# Patient Record
Sex: FEMALE | Race: WHITE | NOT HISPANIC OR LATINO | Employment: FULL TIME | URBAN - METROPOLITAN AREA
[De-identification: names, ages, dates, MRNs, and addresses within clinical notes are randomized per-mention and may not be internally consistent; named-entity substitution may affect disease eponyms.]

---

## 2017-01-23 ENCOUNTER — ALLSCRIPTS OFFICE VISIT (OUTPATIENT)
Dept: OTHER | Facility: OTHER | Age: 27
End: 2017-01-23

## 2017-01-26 LAB
ATOPOBIUM VAGINAE (HISTORICAL): NOT DETECTED LOG (CELLS/ML)
BV CATEGORY (HISTORICAL): NORMAL
C. ALBICANS, DNA OR PCR (HISTORICAL): NOT DETECTED
C. GLABRATA, DNA OR PCR (HISTORICAL): NOT DETECTED
C. PARAPSILOSIS, DNA OR PCR (HISTORICAL): NOT DETECTED
C. TROPICALIS, DNA OR PCR (HISTORICAL): NOT DETECTED
CHLAMYDIA TRACHOMATIS BY MOL. METHOD (HISTORICAL): NOT DETECTED
GARDNERELLA VAGINALIS (HISTORICAL): NOT DETECTED LOG (CELLS/ML)
GC BY MOL. METHOD (HISTORICAL): NOT DETECTED
LACTOBACILLUS (SPECIES) (HISTORICAL): 7.2 LOG (CELLS/ML)
MEGASPHAERA TYPE 1 (HISTORICAL): NOT DETECTED LOG (CELLS/ML)
TRICHOMONAS (HISTORICAL): NOT DETECTED

## 2017-06-18 ENCOUNTER — HOSPITAL ENCOUNTER (EMERGENCY)
Facility: HOSPITAL | Age: 27
Discharge: HOME | End: 2017-06-18
Admitting: EMERGENCY MEDICINE
Payer: COMMERCIAL

## 2017-06-18 VITALS
OXYGEN SATURATION: 97 % | DIASTOLIC BLOOD PRESSURE: 80 MMHG | SYSTOLIC BLOOD PRESSURE: 150 MMHG | TEMPERATURE: 98.2 F | HEART RATE: 90 BPM | WEIGHT: 261 LBS | RESPIRATION RATE: 16 BRPM

## 2017-06-18 DIAGNOSIS — H10.9 CONJUNCTIVITIS: Primary | ICD-10-CM

## 2017-06-18 PROCEDURE — 99282 EMERGENCY DEPT VISIT SF MDM: CPT

## 2017-06-18 RX ORDER — METFORMIN HYDROCHLORIDE 750 MG/1
500 TABLET, EXTENDED RELEASE ORAL 2 TIMES DAILY
COMMUNITY
End: 2019-05-09

## 2017-06-18 RX ORDER — DEXTROAMPHETAMINE SACCHARATE, AMPHETAMINE ASPARTATE, DEXTROAMPHETAMINE SULFATE AND AMPHETAMINE SULFATE 3.75; 3.75; 3.75; 3.75 MG/1; MG/1; MG/1; MG/1
15 TABLET ORAL AS NEEDED
COMMUNITY
End: 2019-03-12

## 2017-06-18 RX ORDER — ERYTHROMYCIN 5 MG/G
0.5 OINTMENT OPHTHALMIC EVERY 6 HOURS
Qty: 3.5 G | Refills: 0 | Status: SHIPPED | OUTPATIENT
Start: 2017-06-18 | End: 2017-06-28

## 2017-06-18 RX ORDER — BUPROPION HYDROCHLORIDE 75 MG/1
75 TABLET ORAL 2 TIMES DAILY
COMMUNITY
End: 2019-03-12

## 2018-01-11 NOTE — RESULT NOTES
Verified Results  (1) URINE CULTURE [id number] 04:00PM Boubacar Goodman     Test Name Result Flag Reference   CULTURE, URINE, ROUTINE  A    CULTURE, URINE, ROUTINE         MICRO NUMBER:      [id number]    TEST STATUS:       FINAL    SPECIMEN SOURCE:   URINE    SPECIMEN QUALITY:  ADEQUATE    RESULT:            Greater than 100,000 CFU/mL of Escherichia coli                            E coli                            ----------------                            INT   SERA     AMOX/CLAVULANATE       S     8 0     AMPICILLIN             R     >=32 0     AMP/SULBACTAM          I     16 0     CEFAZOLIN              NR    <=4 0 **1     CEFEPIME               S     <=1 0     CEFTRIAXONE            S     <=1 0     CIPROFLOXACIN          S     0 5     ERTAPENEM              S     <=0 5     GENTAMICIN             R     >=16 0     IMIPENEM               S     <=0 25     LEVOFLOXACIN           S     1 0     NITROFURANTOIN         S     <=16 0     PIP/TAZOBACTAM         S     <=4 0     TOBRAMYCIN             I     8 0     TRIMETHOPRIM/SULFA     R     >=320 0  S=Susceptible  I=Intermediate  R=Resistant  * = Not Tested  NR = Not Reported  **NN = See Therapy Comments  THERAPY COMMENTS      Note 1:      ORAL therapy: A cefazolin SERA of < 32 predicts      susceptibility to the oral agents cefaclor,      cefdinir, cefpodoxime, cefprozil, cefuroxime,      cephalexin, and loracarbef when used for therapy      of uncomplicated UTIs due to E  coli,      K  pneumoniae, and P  mirabilis  PARENTERAL therapy: A cefazolin SERA of > 8      indicates resistance to parenteral cefazolin  An alternate test method must be performed to      to confirm susceptibility to parenteral cefazolin  NO COLLECTION DATE RECEIVED  WE HAVE USED  THE DATE THE SPECIMEN WAS RECEIVED BY THIS  LABORATORY AS THE COLLECTION DATE  IF THIS  IS INCORRECT, PLEASE CONTACT CLIENT SERVICES    PHONE NUMBER: [phone]

## 2018-01-12 NOTE — PROGRESS NOTES
Chief Complaint  Pt  presents office for urine dip  Symptoms are pain and frequency  No hx stones  known DM II  As per BEO to send for culture and UA      Active Problems    1  Blood type A+ (V49 89) (Z67 10)   2  Contraceptive education (V25 09) (Z30 09)   3  Dietary calcium deficiency (269 3) (E58)   4  Dysmenorrhea (625 3) (N94 6)   5  Encounter for routine gynecological examination (V72 31) (Z01 419)   6  Exposure to STD (V01 6) (Z20 2)   7  High-risk sexual behavior (V69 2) (Z72 51)   8  Inadequate exercise (V69 0) (Z72 3)   9  Inappropriate diet and eating habits (V69 1) (Z72 4)   10  Irritable bowel syndrome (564 1) (K58 9)   11  Morbid obesity due to excess calories (278 01) (E66 01)   12  PCOS (polycystic ovarian syndrome) (256 4) (E28 2)   13  Type 2 diabetes mellitus (250 00) (E11 9)   14  Yeast vaginitis (112 1) (B37 3)    Current Meds   1  Accu-Chek FastClix Lancets Miscellaneous; Therapy: 82IIT5583 to Recorded   2  Accu-Chek Jammie SmartView w/Device Kit; Therapy: 08LUA7440 to Recorded   3  Accu-Chek SmartView In [employer]; Therapy: 15CHG0948 to Recorded   4  Allegra-D Allergy & Congestion 180-240 MG Oral Tablet Extended Release 24 Hour; TAKE   1 TABLET DAILY; Therapy: 69JKE4970 to Recorded   5  BuPROPion HCl - 75 MG Oral Tablet; Therapy: 01ZCD9551 to Recorded   6  Byetta 10 MCG Pen 10 MCG/0 04ML Subcutaneous Solution Pen-injector; Therapy: 27VCQ8635 to Recorded   7  [employer] Unifine Pentips Plus 32G X 4 MM Miscellaneous; Therapy: 86VBZ0231 to Recorded   8  Fluticasone Propionate 50 MCG/ACT Nasal Suspension; Therapy: 68JHX5612 to Recorded   9  MetFORMIN HCl  MG Oral Tablet Extended Release 24 Hour; Therapy: 50KXA7598 to Recorded   10  Norethindrone 0 35 MG Oral Tablet; Take 1 tablet daily; Therapy: 48LRO8512 to (Evaluate:66Mtd5802)  Requested for: 23Nov2016; Last    Rx:23Nov2016 Ordered    Allergies    1   No Known Drug Allergies    Results/Data  Urine Dip Non-Automated- POC [id number] 02:21PM Ariella Womack     Test Name Result Flag Reference   Leukocytes negaive     Nitrite negative     Blood large     Bilirubin small     Urobilinogen 0 2     Protein negative     Ph 5 0     Specific Gravity 1 015     Ketone 80     Glucose 2000         Assessment    1  Hematuria (599 70) (R31 9)   2  Dysuria (788 1) (R30 0)    Plan  Dysuria    · (1) URINE CULTURE; Source:Urine, Clean Catch; Status:Active; Requested  for:73Gyi5685;    · (Q) URINALYSIS MACROSCOPIC; Status:Active; Requested for:55Leg0857;    · (Q) URINALYSIS MICROSCOPIC; Status:Active; Requested for:79Wia2727;    · Urine Dip Non-Automated- POC; Status:Complete;   Done: 99LWE9874 02:21PM    Future Appointments    Date/Time Provider Specialty Site   02/21/2017 03:15 PM MARY Pearce   [facility]     Signatures   Electronically signed by : MARY Pascual ; Dec 20 2016  8:26PM EST                       (Author)

## 2018-01-13 VITALS
SYSTOLIC BLOOD PRESSURE: 124 MMHG | BODY MASS INDEX: 42.43 KG/M2 | DIASTOLIC BLOOD PRESSURE: 80 MMHG | HEIGHT: 66 IN | WEIGHT: 264 LBS

## 2018-01-15 NOTE — CONSULTS
Assessment    1  Irritable bowel syndrome (564 1) (K58 9)   2  Morbid obesity due to excess calories (278 01) (E66 01)   3  Type 2 diabetes mellitus (250 00) (E11 9)    Plan  Morbid obesity due to excess calories    · 1 Mariia Parker MD, [facility]  (General Surgery) Physician Referral  Consult  Status: Active   Requested for: 07NNV3748   Ordered; For: Morbid obesity due to excess calories; Ordered By: Sundar Ashley Performed:  Due: 09YJO8318  Care Summary provided  : Yes   · Follow-up PRN Evaluation and Treatment  Follow-up  Status: Complete  Done:  97HBV9760   Ordered; For: Morbid obesity due to excess calories; Ordered By: Sundar Ashley Performed:  Due: 03MPC0480    Discussion/Summary  Discussion Summary:   77-year-old pleasant female with morbid obesity and type 2 diabetes here for evaluation of weight loss and irritable bowel syndrome  Her irritable bowel syndrome is control his dietary modification  No further workup is indicated for her IBS at this time  Were discussed lifestyle changes such as decreased caloric intake and regular exercise  She is interested in the bariatric surgery for weight loss  I will refer her to bariatric surgery team     She will follow up with me in as-needed basis  Medication SE Review and Pt Understands Tx: Possible side effects of new medications were reviewed with the patient/guardian today  The treatment plan was reviewed with the patient/guardian  The patient/guardian understands and agrees with the treatment plan   Understands and agrees with treatment plan: The treatment plan was reviewed with the patient/guardian  The patient/guardian understands and agrees with the treatment plan   Counseling Documentation With Imm: The patient was counseled regarding diagnostic results, instructions for management, risk factor reductions, prognosis, patient and family education, impressions        Chief Complaint  Chief Complaint Free Text Note Form: Pt with history of IBS here for pre-weight loss surgery consult      History of Present Illness  HPI: 27-year-old pleasant female with history of morbid obesity and type 2 diabetes here for evaluation of morbid obesity and irritable bowel syndrome  Patient has mild abdominal bloating and occasional diarrhea  Her symptoms has been going on for many years  She is able to control her symptoms was dietary modification  Sometimes she gets diarrhea which she attributes to metformin  She is interested in weight loss surgery  She actually tought she is seeing bariatric surgeon today  History Reviewed: The history was obtained today from the patient and I agree with the documented history  Review of Systems  Complete-Female GI Adult:   Constitutional: No fever, no chills, feels well, no tiredness, no recent weight gain or weight loss  Eyes: No complaints of eye pain, no red eyes, no eyesight problems, no discharge, no dry eyes, no itching of eyes  ENT: no complaints of earache, no loss of hearing, no nose bleeds, no nasal discharge, no sore throat, no hoarseness  Cardiovascular: No complaints of slow heart rate, no fast heart rate, no chest pain, no palpitations, no leg claudication, no lower extremity edema  Respiratory: No complaints of shortness of breath, no wheezing, no cough, no SOB on exertion, no orthopnea, no PND  Gastrointestinal: No complaints of abdominal pain, no constipation, no nausea or vomiting, no diarrhea, no bloody stools  Genitourinary: No complaints of dysuria, no incontinence, no pelvic pain, no dysmenorrhea, no vaginal discharge or bleeding  Musculoskeletal: No complaints of arthralgias, no myalgias, no joint swelling or stiffness, no limb pain or swelling  Integumentary: No complaints of skin rash or lesions, no itching, no skin wounds, no breast pain or lump     Neurological: No complaints of headache, no confusion, no convulsions, no numbness, no dizziness or fainting, no tingling, no limb weakness, no difficulty walking  Psychiatric: Not suicidal, no sleep disturbance, no anxiety or depression, no change in personality, no emotional problems  Endocrine: No complaints of proptosis, no hot flashes, no muscle weakness, no deepening of the voice, no feelings of weakness  Hematologic/Lymphatic: No complaints of swollen glands, no swollen glands in the neck, does not bleed easily, does not bruise easily  ROS Reviewed:   ROS reviewed  Active Problems    1  Blood type A+ (V49 89) (Z67 10)   2  Contraceptive education (V25 09) (Z30 09)   3  Dietary calcium deficiency (269 3) (E58)   4  Encounter for routine gynecological examination (V72 31) (Z01 419)   5  Exposure to STD (V01 6) (Z20 2)   6  High-risk sexual behavior (V69 2) (Z72 51)   7  Inadequate exercise (V69 0) (Z72 3)   8  Inappropriate diet and eating habits (V69 1) (Z72 4)   9  Type 2 diabetes mellitus (250 00) (E11 9)    Past Medical History    1  History of , incomplete (637 91) (O03 4)   2  History of Adult BMI 40 0-44 9 kg/sq m (V85 41) (Z68 41)   3  Denied: History of abnormal cervical Pap smear   4  History of dysmenorrhea (V13 29) (Z87 42)   5  Denied: History of herpes simplex infection   6  History of hypercholesterolemia (V12 29) (Z86 39)   7  History of menorrhagia (V13 29) (Z87 42)   8  History of pregnancy (V13 29)   9  History of vaccination against human papillomavirus (V45 89) (Z98 89)   10  History of Migraine without aura (346 10) (G43 009)   11  History of Varicella vaccination (V05 4) (Z23)  Active Problems And Past Medical History Reviewed: The active problems and past medical history were reviewed and updated today  Surgical History    1  History of Oral Surgery Tooth Extraction   2  History of Tonsillectomy  Surgical History Reviewed: The surgical history was reviewed and updated today  Family History    1  Family history of bipolar disorder (V17 0) (Z81 8)   2  Family history of migraine (V17 2) (Z82 0)    3  Family history of stroke (V17 1) (Z82 3)   4  Family history of type 2 diabetes mellitus (V18 0) (Z83 3)    5  Family history of osteoporosis (V17 81) (Z82 62)    6  Family history of type 2 diabetes mellitus (V18 0) (Z83 3)    7  Family history of ischemic heart disease (V17 3) (Z82 49)  Family History Reviewed: The family history was reviewed and updated today  Social History    · Alcohol use (V49 89) (F10 99)   · Education history   · High-risk sexual behavior (V69 2) (Z72 51)   · Denied: History of drug use   · Inadequate exercise (V69 0) (Z72 3)   · Never a smoker   · Occupation   · Zoroastrianism Affiliation   · Single  Social History Reviewed: The social history was reviewed and updated today  The social history was reviewed and is unchanged  Current Meds   1  Allegra-D Allergy & Congestion 180-240 MG Oral Tablet Extended Release 24 Hour; TAKE   1 TABLET DAILY; Therapy: 13AGK4800 to Recorded   2  MetFORMIN HCl - 1000 MG Oral Tablet; TAKE 1 TABLET EVERY 12 HOURS DAILY; Therapy: 24GBW6894 to Recorded  Medication List Reviewed: The medication list was reviewed and updated today  Allergies    1  No Known Drug Allergies    Vitals  Vital Signs [Data Includes: Current Encounter]    Recorded: 21Jan2016 01:19PM   Temperature 97 1 F   Heart Rate 83   Respiration 16   Systolic 355   Diastolic 85   Height 5 ft 6 in   Weight 267 lb    BMI Calculated 43 09   BSA Calculated 2 27     Physical Exam    Constitutional   General appearance: Abnormal   obese  Eyes   Conjunctiva and lids: No swelling, erythema or discharge  Pupils and irises: Equal, round and reactive to light  Ears, Nose, Mouth, and Throat   External inspection of ears and nose: Normal     Nasal mucosa, septum, and turbinates: Normal without edema or erythema  Oropharynx: Normal with no erythema, edema, exudate or lesions  Pulmonary   Respiratory effort: No increased work of breathing or signs of respiratory distress  Auscultation of lungs: Clear to auscultation  Cardiovascular   Palpation of heart: Normal PMI, no thrills  Auscultation of heart: Normal rate and rhythm, normal S1 and S2, without murmurs  Examination of extremities for edema and/or varicosities: Normal     Carotid pulses: Normal     Abdomen   Abdomen: Abnormal   obese, soft non tender, positive bowel sounds  no organomegally  Liver and spleen: No hepatomegaly or splenomegaly  Lymphatic   Palpation of lymph nodes in neck: No lymphadenopathy  Musculoskeletal   Gait and station: Normal     Digits and nails: Normal without clubbing or cyanosis  Inspection/palpation of joints, bones, and muscles: Normal     Skin   Skin and subcutaneous tissue: Normal without rashes or lesions  Neurologic   Cranial nerves: Cranial nerves 2-12 intact  Reflexes: 2+ and symmetric  Sensation: No sensory loss      Psychiatric   Orientation to person, place, and time: Normal     Mood and affect: Normal          Signatures   Electronically signed by : Jeannette Burch MD; Jan 21 2016  1:42PM EST                       (Author)

## 2018-01-16 NOTE — RESULT NOTES
Verified Results  (Q) URINALYSIS MACROSCOPIC [id number] 09:00PM Crane Gross     Test Name Result Flag Reference   COLOR YELLOW  YELLOW   APPEARANCE CLOUDY A CLEAR   SPECIFIC GRAVITY 1 030  1 001-1 035   PH 5 5  5 0-8 0   GLUCOSE 3+ A NEGATIVE   BILIRUBIN NEGATIVE  NEGATIVE   KETONES 1+ A NEGATIVE   OCCULT BLOOD 3+ A NEGATIVE   PROTEIN NEGATIVE  NEGATIVE   NITRITE POSITIVE A NEGATIVE   LEUKOCYTE ESTERASE TRACE A NEGATIVE     (Q) URINALYSIS MICROSCOPIC [id number] 09:00PM Bryant Gross     Test Name Result Flag Reference   WBC > OR = 60 /HPF A < OR = 5   RBC 40-60 /HPF A < OR = 2   SQUAMOUS EPITHELIAL CELLS 0-5 /HPF  < OR = 5   BACTERIA MANY /HPF A NONE SEEN   HYALINE CAST NONE SEEN /LPF  NONE SEEN

## 2018-05-01 ENCOUNTER — CLINICAL SUPPORT (OUTPATIENT)
Dept: OBGYN CLINIC | Facility: CLINIC | Age: 28
End: 2018-05-01
Payer: COMMERCIAL

## 2018-05-01 VITALS — BODY MASS INDEX: 43.45 KG/M2 | WEIGHT: 269.2 LBS

## 2018-05-01 DIAGNOSIS — R30.0 DYSURIA: Primary | ICD-10-CM

## 2018-05-01 LAB
SL AMB  POCT GLUCOSE, UA: NEGATIVE
SL AMB LEUKOCYTE ESTERASE,UA: NEGATIVE
SL AMB POCT BILIRUBIN,UA: NORMAL
SL AMB POCT BLOOD,UA: NEGATIVE
SL AMB POCT CLARITY,UA: NORMAL
SL AMB POCT COLOR,UA: NORMAL
SL AMB POCT KETONES,UA: NORMAL
SL AMB POCT NITRITE,UA: NEGATIVE
SL AMB POCT PH,UA: 5
SL AMB POCT SPECIFIC GRAVITY,UA: 1.02
SL AMB POCT URINE PROTEIN: NORMAL
SL AMB POCT UROBILINOGEN: 0.2

## 2018-05-01 PROCEDURE — 81002 URINALYSIS NONAUTO W/O SCOPE: CPT | Performed by: OBSTETRICS & GYNECOLOGY

## 2018-05-01 NOTE — PROGRESS NOTES
Patient presents to office for walk in Urine Dip, patient states she is experiencing frequency  Urine culture sent to lab for testing

## 2018-10-01 ENCOUNTER — ANNUAL EXAM (OUTPATIENT)
Dept: OBGYN CLINIC | Facility: CLINIC | Age: 28
End: 2018-10-01
Payer: COMMERCIAL

## 2018-10-01 VITALS
BODY MASS INDEX: 42.01 KG/M2 | DIASTOLIC BLOOD PRESSURE: 74 MMHG | SYSTOLIC BLOOD PRESSURE: 118 MMHG | HEIGHT: 66 IN | WEIGHT: 261.4 LBS

## 2018-10-01 DIAGNOSIS — Z20.2 POSSIBLE EXPOSURE TO STD: ICD-10-CM

## 2018-10-01 DIAGNOSIS — Z72.3 INADEQUATE EXERCISE: ICD-10-CM

## 2018-10-01 DIAGNOSIS — Z01.419 ENCOUNTER FOR ANNUAL ROUTINE GYNECOLOGICAL EXAMINATION: Primary | ICD-10-CM

## 2018-10-01 DIAGNOSIS — E58 DIETARY CALCIUM DEFICIENCY: ICD-10-CM

## 2018-10-01 DIAGNOSIS — Z12.4 PAP SMEAR FOR CERVICAL CANCER SCREENING: ICD-10-CM

## 2018-10-01 DIAGNOSIS — Z30.09 CONTRACEPTIVE EDUCATION: ICD-10-CM

## 2018-10-01 PROCEDURE — 99395 PREV VISIT EST AGE 18-39: CPT | Performed by: OBSTETRICS & GYNECOLOGY

## 2018-10-01 NOTE — PROGRESS NOTES
Pt is a 29 y o  Leon Angulo with Patient's last menstrual period was 2018  using none for [facility] presents for preventive care  She notes the different partner since her last STI evaluation  In her lifetime she has been involved with >5 partners   Safe sexual practices (monogomy, condoms) are not followed consistently  Pt reports she is interested in an IUD for contraception--reviewed laisha, kyleena, jennifer and paragard  Pt desires kyleena--reviewed risks of irregular bleedig, pain, uterine perforation, infection, failure and ectopic pregnancy and pt desires to proceed  · She does  feel safe in the relationship  She does feel safe in her home  · Her calcium intake encompasses milk (cow, goat, almond, cashew, soy, etc), cheese and yogurt for a total of 1-2 servings daily on average  She does not take additional Vitamin D (MVI or supplement)  · She exercises minimal times per week  · Her menses occur every 28 Days, last 2-3 days and require regular tampons every 7-8 hours  Menstrual History:  OB History      Para Term  AB Living    2 0     2      SAB TAB Ectopic Multiple Live Births    1 1              Obstetric Comments    Menarche: 12    Menses: 28/2-3/regular tampon every 8 hours           Menarche age: 15  Patient's last menstrual period was 2018  ·      · She has completed the HPV vaccine series appropriate for age    · tobacco use : does not use tobacco              · Last pap: ~; repeat today  · Pt desires ch/wilbert screening    Past Medical History:   Diagnosis Date    Blood type A+     Diabetes mellitus ([facility] [address] )     per [employer]: Type 2    Dietary calcium deficiency     Last Assessed:8/18/15    Dysmenorrhea     adequate response to IBuprofen; Last Assessed:16    Dysuria     LAst Assessed:16    Hematuria     Last Assessed:16    Hypercholesterolemia     IBS (irritable bowel syndrome)     Last Assessed:16    Menorrhagia     Migraine without aura     Morbid obesity due to excess calories (HCC)     Last Assessed:16    PCOS (polycystic ovarian syndrome)        Past Surgical History:   Procedure Laterality Date    DILATION AND EVACUATION  12/15/2017    ETOP    TONSILLECTOMY      WISDOM TOOTH EXTRACTION         OB History    Para Term  AB Living   2 0     2     SAB TAB Ectopic Multiple Live Births   1 1            # Outcome Date GA Lbr Nicolás/2nd Weight Sex Delivery Anes PTL Lv   2 TAB            1 SAB               Obstetric Comments   Menarche: 12      Menses: 28/2-3/regular tampon every 8 hours       Gyn HX:  dysmenorrhea       Current Outpatient Prescriptions:     amphetamine-dextroamphetamine (ADDERALL) 15 MG tablet, Take 15 mg by mouth as needed, Disp: , Rfl:     buPROPion (WELLBUTRIN) 75 mg tablet, Take 75 mg by mouth 2 (two) times a day, Disp: , Rfl:     Exenatide (BYETTA 10 MCG PEN SC), Inject under the skin, Disp: , Rfl:     metFORMIN (GLUCOPHAGE-XR) 750 mg 24 hr tablet, Take 750 mg by mouth 2 (two) times a day, Disp: , Rfl:     Allergies   Allergen Reactions    Amoxicillin Other (See Comments)     Yeast infection       Social History     Social History    Marital status: Single     Spouse name: N/A    Number of children: 0    Years of education: HS, cosmetology     Occupational History    beauty/hair salon      Social History Main Topics    Smoking status: Never Smoker    Smokeless tobacco: Never Used    Alcohol use Yes      Comment: occ; 1x/month    Drug use: No    Sexual activity: Yes     Partners: Male     Birth control/ protection: None      Comment: lifetime partners: 21; current partner 1 month     Other Topics Concern    None     Social History Narrative    Inadequate Exercise-none    Inappropriate diet and eating habits    Education: GED    Orthodox: Oriental orthodox non Rastafari    Accepts blood products    Calcium: irregular vitamin use, 1 c almond 3x/week; occ cheese, 1 yogurt 3-4x/week Family History   Problem Relation Age of Onset    Bipolar disorder Mother     Migraines Mother     Stroke Father     Diabetes type II Father     Alcohol abuse Father     Osteoporosis Maternal Grandmother     Lymphoma Maternal Grandmother     Heart disease Maternal Grandfather         Ischemic    Prostate cancer Maternal Grandfather     Diabetes type II Paternal Grandmother     No Known Problems Sister     Breast cancer Neg Hx     Colon cancer Neg Hx     Ovarian cancer Neg Hx        Blood pressure 118/74, height 5' 5 75" (1 67 m), weight 119 kg (261 lb 6 4 oz), last menstrual period 09/14/2018  and Body mass index is 42 52 kg/m²  Physical Exam   Constitutional: She is oriented to person, place, and time  She appears well-developed and well-nourished  HENT:   Head: Normocephalic and atraumatic  Eyes: Conjunctivae and EOM are normal    Neck: Normal range of motion  Neck supple  No tracheal deviation present  No thyromegaly present  Cardiovascular: Normal rate, regular rhythm and normal heart sounds  Pulmonary/Chest: Effort normal and breath sounds normal  No stridor  No respiratory distress  She has no wheezes  She has no rales  Abdominal: Soft  Bowel sounds are normal  She exhibits no distension and no mass  There is no tenderness  There is no rebound and no guarding  Musculoskeletal: Normal range of motion  She exhibits no edema or tenderness  Lymphadenopathy:     She has no cervical adenopathy  Neurological: She is alert and oriented to person, place, and time  Skin: Skin is warm  No rash noted  No erythema  Psychiatric: She has a normal mood and affect  Her behavior is normal  Judgment and thought content normal      Breasts: breasts appear normal, no suspicious masses, no skin or nipple changes or axillary nodes, symmetric fibrous changes in both upper outer quadrants       vulva: normal external genitalia for age and no lesions, masses, epithelial changes, or exudate  vagina: color pink and rugae  well formed rugae  cervix: nullip and no lesions   uterus: NSSC, AF, NT, mobile  adnexa: no masses or tenderness      A/P:  Pt is a 29 y o  Hector Fox with      Diagnoses and all orders for this visit:    Encounter for annual routine gynecological examination    Pap smear for cervical cancer screening  -     Thinprep Tis Pap Reflex HPV mRNA E6/E7, Chlamydia/N gonorrhoeae    Contraceptive education    Possible exposure to STD  -     Thinprep Tis Pap Reflex HPV mRNA E6/E7, Chlamydia/N gonorrhoeae    Dietary calcium deficiency    Inadequate exercise      Pt will present for Kyleena insertion  Patient advised recommendation of daily dietary calcium of 1000 mg calcium  Patient advised recommendation of exercise 5 times per week for 30 minutes  Patient advised recommendation of BMI to be between 19-25

## 2018-10-04 LAB
C TRACH RRNA SPEC QL NAA+PROBE: NOT DETECTED
CLINICAL INFO: NORMAL
CYTO CVX: NORMAL
CYTOLOGY CMNT CVX/VAG CYTO-IMP: NORMAL
DATE PREVIOUS BX: NORMAL
LMP START DATE: NORMAL
N GONORRHOEA RRNA SPEC QL NAA+PROBE: NOT DETECTED
SL AMB PREV. PAP:: NORMAL
SPECIMEN SOURCE CVX/VAG CYTO: NORMAL

## 2018-10-12 ENCOUNTER — TELEPHONE (OUTPATIENT)
Dept: OBGYN CLINIC | Facility: CLINIC | Age: 28
End: 2018-10-12

## 2018-10-12 NOTE — TELEPHONE ENCOUNTER
LMOM with detailed message that insertion of [facility] is covered at 100% with no copay and she may schedule an appointment

## 2018-10-18 ENCOUNTER — PROCEDURE VISIT (OUTPATIENT)
Dept: OBGYN CLINIC | Facility: CLINIC | Age: 28
End: 2018-10-18
Payer: COMMERCIAL

## 2018-10-18 VITALS
DIASTOLIC BLOOD PRESSURE: 90 MMHG | HEIGHT: 66 IN | WEIGHT: 268 LBS | BODY MASS INDEX: 43.07 KG/M2 | SYSTOLIC BLOOD PRESSURE: 145 MMHG

## 2018-10-18 DIAGNOSIS — Z30.430 ENCOUNTER FOR IUD INSERTION: Primary | ICD-10-CM

## 2018-10-18 LAB — SL AMB POCT URINE HCG: NEGATIVE

## 2018-10-18 PROCEDURE — 81025 URINE PREGNANCY TEST: CPT | Performed by: NURSE PRACTITIONER

## 2018-10-18 PROCEDURE — 58300 INSERT INTRAUTERINE DEVICE: CPT | Performed by: NURSE PRACTITIONER

## 2018-10-18 NOTE — PROGRESS NOTES
Iud insertions  Date/Time: 10/18/2018 11:35 AM  Performed by: Merrick Burroughs  Authorized by: Merrick Burroughs     Consent:     Consent obtained:  Written    Consent given by:  Patient    Procedure risks and benefits discussed: yes      Patient questions answered: yes      Patient agrees, verbalizes understanding, and wants to proceed: yes      Educational handouts given: yes      Instructions and paperwork completed: yes    Procedure:     Pelvic exam performed: no      Negative GC/chlamydia test: yes      Negative urine pregnancy test: yes      Cervix cleaned and prepped: yes      Speculum placed in vagina: yes      Tenaculum applied to cervix: yes      Uterus sounded: yes      IUD type: Shereen Patella  Strings trimmed: yes      Uterus sound depth (cm):  9  Post-procedure:     Patient tolerated procedure well: yes      Patient will follow up after next period: yes    Comments:      Lot # [id number], exp 02/19      post IUD instructions reviewed and provided in written form  RV 5 wks for IUD check

## 2018-10-18 NOTE — PATIENT INSTRUCTIONS
For current episode of bleeding, use sanitary pads only, not tampons  You may resume using tampons with future periods  Abstain from sex for 3 days or use condoms  Call if you experience symptoms of infection, such as foul smelling vaginal discharge, pain that is getting worse instead of better, fever or chills, or very heavy bleeding  Return visit for IUD check in 5-6 weeks

## 2018-10-26 ENCOUNTER — TELEPHONE (OUTPATIENT)
Dept: OBGYN CLINIC | Facility: CLINIC | Age: 28
End: 2018-10-26

## 2018-10-30 ENCOUNTER — OFFICE VISIT (OUTPATIENT)
Dept: OBGYN CLINIC | Facility: CLINIC | Age: 28
End: 2018-10-30
Payer: COMMERCIAL

## 2018-10-30 VITALS
DIASTOLIC BLOOD PRESSURE: 70 MMHG | BODY MASS INDEX: 43.16 KG/M2 | OXYGEN SATURATION: 99 % | HEART RATE: 92 BPM | WEIGHT: 267.4 LBS | SYSTOLIC BLOOD PRESSURE: 120 MMHG

## 2018-10-30 DIAGNOSIS — N89.8 VAGINAL ODOR: ICD-10-CM

## 2018-10-30 DIAGNOSIS — Z30.430 ENCOUNTER FOR IUD INSERTION: Primary | ICD-10-CM

## 2018-10-30 LAB — SL AMB POCT WET MOUNT: ABNORMAL

## 2018-10-30 PROCEDURE — 99213 OFFICE O/P EST LOW 20 MIN: CPT | Performed by: NURSE PRACTITIONER

## 2018-10-30 PROCEDURE — 87210 SMEAR WET MOUNT SALINE/INK: CPT | Performed by: NURSE PRACTITIONER

## 2018-10-30 RX ORDER — METRONIDAZOLE 500 MG/1
500 TABLET ORAL EVERY 12 HOURS SCHEDULED
Qty: 14 TABLET | Refills: 0 | Status: SHIPPED | OUTPATIENT
Start: 2018-10-30 | End: 2018-11-06

## 2018-10-30 RX ORDER — FLUCONAZOLE 150 MG/1
150 TABLET ORAL ONCE
Qty: 2 TABLET | Refills: 0 | Status: SHIPPED | OUTPATIENT
Start: 2018-10-30 | End: 2018-10-30

## 2018-10-30 NOTE — PROGRESS NOTES
Assessment/Plan:    1  Encounter for IUD insertion  Normal IUD check  rv 4 wks for regular IUD check    2  Vaginal odor  WM + for clue cells  rx sent for oral metronidole  Also fluconazole since diabetic and prone to yeast infections  Strongly counseled to use condoms for STI protection  - metroNIDAZOLE (FLAGYL) 500 mg tablet; Take 1 tablet (500 mg total) by mouth every 12 (twelve) hours for 7 days  Dispense: 14 tablet; Refill: 0  - fluconazole (DIFLUCAN) 150 mg tablet; Take 1 tablet (150 mg total) by mouth once for 1 dose Re[eat 2nd dose in 3 days if needed  Dispense: 2 tablet; Refill: 0  - POCT wet mount      Subjective:      Patient ID: Leon Hair is a 29 y o  female  HPI  PROBLEM VISIT  CC: vaginal discharge with odor/ cramping  10 days post IUD insertion     Cramping has continued since insertion of IUD  Discharge started this past week with odor  No abn bleeding or pelvic pain  No urinary symptoms  Sexually active, one month relationship  Not using condoms  States that all condoms, latex or nonlatex, irritate her  The following portions of the patient's history were reviewed and updated as appropriate: allergies, current medications, past family history, past medical history, past social history, past surgical history and problem list     Review of Systems   Constitutional: Negative for chills and fever  Genitourinary: Positive for vaginal discharge (with odor)  Negative for dyspareunia, dysuria, frequency, genital sores, hematuria, menstrual problem, pelvic pain, urgency, vaginal bleeding and vaginal pain  Cramping         Objective:    /70 (BP Location: Left arm, Patient Position: Sitting, Cuff Size: Adult)   Pulse 92   Wt 121 kg (267 lb 6 4 oz)   LMP 10/12/2018   SpO2 99%   BMI 43 16 kg/m²     Wet mount: + Clue cells, neg hyphae or buds, neg trich; + amine; rare lactobacilli     Physical Exam   Constitutional: She is oriented to person, place, and time   She appears well-developed  HENT:   Head: Normocephalic and atraumatic  Eyes: Pupils are equal, round, and reactive to light  Pulmonary/Chest: Effort normal    Genitourinary: There is no rash, tenderness, lesion or injury on the right labia  There is no rash, tenderness, lesion or injury on the left labia  Uterus is not enlarged and not tender  Cervix exhibits no motion tenderness, no discharge and no friability  Right adnexum displays no mass and no tenderness  Left adnexum displays no mass and no tenderness  No erythema, tenderness or bleeding in the vagina  No foreign body in the vagina  No signs of injury around the vagina  Vaginal discharge (SCANT, + ODOR) found  Lymphadenopathy:        Right: No inguinal adenopathy present  Left: No inguinal adenopathy present  Neurological: She is alert and oriented to person, place, and time  Skin: Skin is warm and dry  Psychiatric: She has a normal mood and affect   Her behavior is normal  Thought content normal

## 2018-11-27 ENCOUNTER — OFFICE VISIT (OUTPATIENT)
Dept: OBGYN CLINIC | Facility: CLINIC | Age: 28
End: 2018-11-27
Payer: COMMERCIAL

## 2018-11-27 VITALS — WEIGHT: 265 LBS | SYSTOLIC BLOOD PRESSURE: 142 MMHG | DIASTOLIC BLOOD PRESSURE: 96 MMHG | BODY MASS INDEX: 42.77 KG/M2

## 2018-11-27 DIAGNOSIS — B37.3 VULVOVAGINAL CANDIDIASIS: Primary | ICD-10-CM

## 2018-11-27 LAB — SL AMB POCT WET MOUNT: ABNORMAL

## 2018-11-27 PROCEDURE — 87210 SMEAR WET MOUNT SALINE/INK: CPT | Performed by: NURSE PRACTITIONER

## 2018-11-27 PROCEDURE — 99213 OFFICE O/P EST LOW 20 MIN: CPT | Performed by: NURSE PRACTITIONER

## 2018-11-27 RX ORDER — FLUCONAZOLE 150 MG/1
150 TABLET ORAL ONCE
Qty: 2 TABLET | Refills: 0 | Status: SHIPPED | OUTPATIENT
Start: 2018-11-27 | End: 2018-11-27

## 2018-11-27 NOTE — PROGRESS NOTES
Assessment/Plan:    1  Vulvovaginal candidiasis  Wet mount + for buds  rx sent for fluconazole and topical   Stressed tight control of blood sugars  - fluconazole (DIFLUCAN) 150 mg tablet; Take 1 tablet (150 mg total) by mouth once for 1 dose Re[eat 2nd dose in 3 days if needed  Dispense: 2 tablet; Refill: 0  - triamcinolone (KENALOG) 0 1 % ointment; Apply topically 2 (two) times a day  Dispense: 30 g; Refill: 0      Subjective:      Patient ID: Shashank Dunne is a 29 y o  female  HPI  PROBLEM VISIT  CC: vulvovaginitis    Seen by Dr Bella Pan 10/2018 for her yearly  Adeola Ced IUD for birth control which was inserted by me on 10/18/18  On 10/30/18 came in for office visit with c/o vaginal odor, BV was diagnosed and she was treated with oral metronidazole w/ fluconazole to prevent yeast infection  States that she had an episode of elongated bleeding x 15 days after taking the metronidazole, also had intense cramping  Both have resolved  Today c/o discharge and itching  Pmhx: morbid obesity, type 2 diabetes, PCOS    The following portions of the patient's history were reviewed and updated as appropriate: allergies, current medications, past family history, past medical history, past social history, past surgical history and problem list     Review of Systems   Constitutional: Negative for chills and fever  Genitourinary: Negative for dyspareunia, dysuria, frequency, genital sores, hematuria, menstrual problem, pelvic pain, urgency, vaginal bleeding, vaginal discharge and vaginal pain  Objective:    /96   Wt 120 kg (265 lb)   LMP 10/31/2018 (Exact Date)   Breastfeeding? No   BMI 42 77 kg/m²      Physical Exam   Constitutional: She is oriented to person, place, and time  She appears well-developed and well-nourished  She appears distressed  HENT:   Head: Normocephalic and atraumatic  Eyes: Pupils are equal, round, and reactive to light     Pulmonary/Chest: Effort normal  Genitourinary:       There is no rash, tenderness, lesion or injury on the right labia  There is no rash, tenderness, lesion or injury on the left labia  Uterus is not enlarged and not tender  Cervix exhibits no motion tenderness, no discharge and no friability  Right adnexum displays no mass and no tenderness  Left adnexum displays no mass and no tenderness  No erythema, tenderness or bleeding in the vagina  No foreign body in the vagina  No signs of injury around the vagina  Vaginal discharge (no odor, normal pH) found  Genitourinary Comments: + IUD strings   Lymphadenopathy:        Right: No inguinal adenopathy present  Left: No inguinal adenopathy present  Neurological: She is alert and oriented to person, place, and time  Psychiatric: She has a normal mood and affect   Her behavior is normal  Judgment and thought content normal

## 2018-12-20 ENCOUNTER — PROCEDURE VISIT (OUTPATIENT)
Dept: OBGYN CLINIC | Facility: CLINIC | Age: 28
End: 2018-12-20
Payer: COMMERCIAL

## 2018-12-20 VITALS
OXYGEN SATURATION: 99 % | WEIGHT: 266 LBS | DIASTOLIC BLOOD PRESSURE: 80 MMHG | SYSTOLIC BLOOD PRESSURE: 122 MMHG | HEART RATE: 74 BPM | BODY MASS INDEX: 42.93 KG/M2

## 2018-12-20 DIAGNOSIS — Z30.011 ORAL CONTRACEPTIVE PRESCRIBED: ICD-10-CM

## 2018-12-20 DIAGNOSIS — N76.0 RECURRENT VAGINITIS: ICD-10-CM

## 2018-12-20 DIAGNOSIS — Z30.432 ENCOUNTER FOR IUD REMOVAL: Primary | ICD-10-CM

## 2018-12-20 DIAGNOSIS — Z11.3 SCREENING EXAMINATION FOR STD (SEXUALLY TRANSMITTED DISEASE): ICD-10-CM

## 2018-12-20 PROCEDURE — 58301 REMOVE INTRAUTERINE DEVICE: CPT | Performed by: NURSE PRACTITIONER

## 2018-12-20 RX ORDER — METRONIDAZOLE 500 MG/1
500 TABLET ORAL EVERY 12 HOURS SCHEDULED
Qty: 14 TABLET | Refills: 0 | Status: SHIPPED | OUTPATIENT
Start: 2018-12-20 | End: 2018-12-27

## 2018-12-20 RX ORDER — NORGESTIMATE AND ETHINYL ESTRADIOL 7DAYSX3 LO
1 KIT ORAL DAILY
Qty: 28 TABLET | Refills: 12 | Status: SHIPPED | OUTPATIENT
Start: 2018-12-20 | End: 2019-03-12

## 2018-12-20 RX ORDER — FLUCONAZOLE 150 MG/1
150 TABLET ORAL ONCE
Qty: 2 TABLET | Refills: 0 | Status: SHIPPED | OUTPATIENT
Start: 2018-12-20 | End: 2018-12-20

## 2018-12-20 NOTE — PROGRESS NOTES
Assessment/Plan:    1  Encounter for IUD removal  IUD removed with ease  2  Recurrent vaginitis  Clinically consistent with BV  Culture obtained since has been recurrent  rx sent for metronidazole & diflucan    - metroNIDAZOLE (FLAGYL) 500 mg tablet; Take 1 tablet (500 mg total) by mouth every 12 (twelve) hours for 7 days  Dispense: 14 tablet; Refill: 0  - fluconazole (DIFLUCAN) 150 mg tablet; Take 1 tablet (150 mg total) by mouth once for 1 dose Re[eat 2nd dose in 3 days if needed  Dispense: 2 tablet; Refill: 0  - Sureswab(R), Vaginosis/Vaginitis Plus    3  Oral contraceptive prescribed  Has used DANGELO in past without problems  rx sent for OTC-Lo    - norgestimate-ethinyl estradiol (ORTHO TRI-CYCLEN LO) 0 18/0 215/0 25 MG-25 MCG per tablet; Take 1 tablet by mouth daily  Dispense: 28 tablet; Refill: 12    4  Screening examination for STD (sexually transmitted disease)        Subjective:      Patient ID: Mo Lechuga is a 29 y o  female  HPI   PROBLEM VISIT  CC: recurrent vaginitis/ removal of IUD    10/2018 Khadijah Corona insertion  Has been experiencing recurrent vaginitis over the past 4 months  Desires removal of the IUD  Has used OCP in past without problems and would like to resume same  Also c/o malodorous vaginal discharge today  The following portions of the patient's history were reviewed and updated as appropriate: allergies, current medications, past family history, past medical history, past social history, past surgical history and problem list     Review of Systems   Constitutional: Negative for chills and fever  Genitourinary: Positive for vaginal discharge (with odor)  Negative for dyspareunia, dysuria, frequency, genital sores, hematuria, menstrual problem, pelvic pain, urgency, vaginal bleeding and vaginal pain          Recent antibiotic treatment         Objective:    /80 (BP Location: Left arm, Patient Position: Sitting, Cuff Size: Adult)   Pulse 74   Wt 121 kg (266 lb)   LMP 12/15/2018   SpO2 99%   BMI 42 93 kg/m²      Physical Exam   Constitutional: She is oriented to person, place, and time  She appears well-developed and well-nourished  No distress  HENT:   Head: Normocephalic and atraumatic  Eyes: Pupils are equal, round, and reactive to light  Pulmonary/Chest: Effort normal    Genitourinary: There is no rash, tenderness, lesion or injury on the right labia  There is no rash, tenderness, lesion or injury on the left labia  Cervix exhibits no motion tenderness, no discharge and no friability  No erythema, tenderness or bleeding in the vagina  No signs of injury around the vagina  Vaginal discharge (with odor) found  Neurological: She is alert and oriented to person, place, and time  Psychiatric: She has a normal mood and affect   Her behavior is normal  Judgment and thought content normal          Iud removal  Date/Time: 12/20/2018 11:57 AM  Performed by: [email or web address]  Authorized by: [employer]     Consent:     Consent obtained:  Written    Consent given by:  Patient    Procedure risks and benefits discussed: yes      Patient questions answered: yes      Patient agrees, verbalizes understanding, and wants to proceed: yes      Instructions and paperwork completed: yes    Procedure:     Removed with no complications: yes      Removal due to infection and inflammatory reaction: yes      Other reason for removal:  Recurrent vaginitis

## 2018-12-24 LAB
A VAGINAE DNA VAG NAA+PROBE-LOG#: 7.6 LOG (CELLS/ML)
C GLABRATA DNA VAG QL NAA+PROBE: NOT DETECTED
C TRACH RRNA SPEC QL NAA+PROBE: NOT DETECTED
CANDIDA DNA VAG QL NAA+PROBE: NOT DETECTED
G VAGINALIS DNA VAG NAA+PROBE-LOG#: >8 LOG (CELLS/ML)
LACTOBACILLUS DNA VAG NAA+PROBE-LOG#: 5 LOG (CELLS/ML)
MEGASPHAERA SP DNA VAG NAA+PROBE-LOG#: >8 LOG (CELLS/ML)
N GONORRHOEA RRNA SPEC QL NAA+PROBE: NOT DETECTED
SL AMB BV CATEGORY:: ABNORMAL
SL AMB C. PARAPSILOSIS, DNA: NOT DETECTED
SL AMB C. TROPICALIS, DNA: NOT DETECTED
T VAGINALIS RRNA SPEC QL NAA+PROBE: NOT DETECTED

## 2019-01-07 ENCOUNTER — TELEPHONE (OUTPATIENT)
Dept: OBGYN CLINIC | Facility: CLINIC | Age: 29
End: 2019-01-07

## 2019-01-07 NOTE — TELEPHONE ENCOUNTER
Tried calling pt to schedule appt with Diony Cuellar or Dr Khadijah Corona but unable to leave  due to full mailbox

## 2019-01-07 NOTE — TELEPHONE ENCOUNTER
Pt called stating she still has ongoing bv/yeast infection with no relief from medication prescribed  Would like to know what will be her next step to get relief   [phone]

## 2019-01-07 NOTE — TELEPHONE ENCOUNTER
30 yo with recurrent vaginitis and hx of diabetes  Just treated for equivocal BV on culture w/ oral metronidazole and fluconazole for prevention of yeast     Still symptomatic  10/30/18 WM: BV- oral met w/ fluc to follow  11/27/18 WM: + candida- fluc w/ topical  12/20/18 IUD removed  Vag culture: equivocal for BV; oral met w/ fluc again  Advise patient to make an appointment with either me or Dr Sonia Osborne for further evaluation and treatment plan

## 2019-02-06 ENCOUNTER — TELEPHONE (OUTPATIENT)
Dept: OBGYN CLINIC | Facility: CLINIC | Age: 29
End: 2019-02-06

## 2019-02-06 NOTE — TELEPHONE ENCOUNTER
I would recommend Dr López Mention for the surgery  Please give her the number    Pt may return here for continued GYN care before and after the surgery

## 2019-02-06 NOTE — TELEPHONE ENCOUNTER
Patient called and would like to know a DR you could refer her too for a tubal  Patient can be reached at [phone]

## 2019-02-19 ENCOUNTER — CLINICAL SUPPORT (OUTPATIENT)
Dept: OBGYN CLINIC | Facility: CLINIC | Age: 29
End: 2019-02-19
Payer: COMMERCIAL

## 2019-02-19 DIAGNOSIS — R35.0 URINE FREQUENCY: Primary | ICD-10-CM

## 2019-02-19 LAB
SL AMB  POCT GLUCOSE, UA: 1000
SL AMB LEUKOCYTE ESTERASE,UA: ABNORMAL
SL AMB POCT BILIRUBIN,UA: NEGATIVE
SL AMB POCT BLOOD,UA: NEGATIVE
SL AMB POCT CLARITY,UA: ABNORMAL
SL AMB POCT COLOR,UA: YELLOW
SL AMB POCT KETONES,UA: NEGATIVE
SL AMB POCT NITRITE,UA: NEGATIVE
SL AMB POCT PH,UA: 5
SL AMB POCT SPECIFIC GRAVITY,UA: 1.01
SL AMB POCT URINE PROTEIN: NEGATIVE
SL AMB POCT UROBILINOGEN: 0.2

## 2019-02-19 PROCEDURE — 81002 URINALYSIS NONAUTO W/O SCOPE: CPT | Performed by: OBSTETRICS & GYNECOLOGY

## 2019-02-19 NOTE — PROGRESS NOTES
Patient presents to office for walk-in urine dip due to following symptoms: urge to urinate frequently, unable to empty bladder, slight nausea  Patient started taking AZO two days ago, did not take any today  UA dip shows sugar and trace leukocytes, will send for UA

## 2019-02-20 LAB
APPEARANCE UR: CLEAR
BACTERIA UR CULT: ABNORMAL
BACTERIA UR QL AUTO: ABNORMAL /HPF
BILIRUB UR QL STRIP: NEGATIVE
COLOR UR: YELLOW
GLUCOSE UR QL STRIP: ABNORMAL
HGB UR QL STRIP: NEGATIVE
HYALINE CASTS #/AREA URNS LPF: ABNORMAL /LPF
KETONES UR QL STRIP: NEGATIVE
LEUKOCYTE ESTERASE UR QL STRIP: NEGATIVE
NITRITE UR QL STRIP: NEGATIVE
PH UR STRIP: 5.5 [PH] (ref 5–8)
PROT UR QL STRIP: NEGATIVE
RBC #/AREA URNS HPF: ABNORMAL /HPF
SP GR UR STRIP: 1.04 (ref 1–1.03)
SQUAMOUS #/AREA URNS HPF: ABNORMAL /HPF
WBC #/AREA URNS HPF: ABNORMAL /HPF

## 2019-03-12 ENCOUNTER — OFFICE VISIT (OUTPATIENT)
Dept: OBGYN CLINIC | Facility: CLINIC | Age: 29
End: 2019-03-12
Payer: COMMERCIAL

## 2019-03-12 VITALS
SYSTOLIC BLOOD PRESSURE: 138 MMHG | WEIGHT: 265 LBS | BODY MASS INDEX: 44.15 KG/M2 | DIASTOLIC BLOOD PRESSURE: 78 MMHG | HEIGHT: 65 IN

## 2019-03-12 DIAGNOSIS — N76.0 RECURRENT VAGINITIS: Primary | ICD-10-CM

## 2019-03-12 PROCEDURE — 99213 OFFICE O/P EST LOW 20 MIN: CPT | Performed by: NURSE PRACTITIONER

## 2019-03-12 RX ORDER — ACETAMINOPHEN, ASPIRIN AND CAFFEINE 250; 250; 65 MG/1; MG/1; MG/1
1-2 TABLET, FILM COATED ORAL
COMMUNITY
End: 2019-03-27 | Stop reason: CLARIF

## 2019-03-12 RX ORDER — ERGOCALCIFEROL 1.25 MG/1
1 CAPSULE ORAL
COMMUNITY
Start: 2012-10-24 | End: 2019-03-12

## 2019-03-12 RX ORDER — FEXOFENADINE HYDROCHLORIDE 60 MG/1
1 TABLET, FILM COATED ORAL EVERY 12 HOURS
COMMUNITY
Start: 2012-10-24 | End: 2019-03-12

## 2019-03-12 RX ORDER — LOVASTATIN 20 MG/1
1 TABLET ORAL EVERY 12 HOURS
COMMUNITY
Start: 2012-10-24 | End: 2019-03-12

## 2019-03-12 RX ORDER — AMOXICILLIN 500 MG/1
1 CAPSULE ORAL EVERY 8 HOURS
COMMUNITY
Start: 2013-11-04 | End: 2019-03-12

## 2019-03-12 RX ORDER — FLUCONAZOLE 150 MG/1
1 TABLET ORAL
COMMUNITY
Start: 2013-11-04 | End: 2019-03-12

## 2019-03-12 RX ORDER — FAMOTIDINE 40 MG/1
40 TABLET, FILM COATED ORAL 2 TIMES DAILY PRN
COMMUNITY
Start: 2016-12-29 | End: 2019-03-12

## 2019-03-12 NOTE — PROGRESS NOTES
Assessment/Plan:    1  Recurrent vaginitis  Patient strongly believes that her recurrent symptoms are due to birth control and therefore she stopped OCP  She will see Dr Alicia Melchor for a tubal ligation consult  Stressed importance of her diabetes management as key for prevention of recurrent candida  Culture obtained  Will treat accordingly  Subjective:      Patient ID: Shashank Dunne is a 29 y o  female  HPI  PROBLEM VISIT  CC: vulvovaginal symptoms, recurrent    Symptoms started when IUD was removed end of Dec 18/Joni 19  Discharge was initially grainy and grey in color with itching and dryness  Pt states she was on Tamiflu for influenza that was completed 2 weeks ago  3 days ago discharge was thick yellow mucus like with foul odor  Patient tried Monistat with no relief  Patient did not take bgs reading today  Reports most readings have been in 120s  Lmp 3/2 to 3/4  Last time patient had intercourse was over 1 month ago  Stopped taking oral birth control pills 3 weeks ago  In the process of seeing Dr Alicia Melchor for tubal sterilization  10/18/18 Kyleena inserted  10/30- WM + BV; treated w/ oral metronidazole & proph fluconazole  11/27- WM + candida- fluconazole & triamcinolone  12/20- had Marcheta Deed removed d/t recurrent vag infxs              g/c neg; vag cx equivocal for BV- oral met &             prophylatic fluconazole  2/19/19 UA dip for urinary sxs: + glucose    Pmhx: diabetes, morbid obesity, PCOS  8/2018 hgb A1c = 11 1      The following portions of the patient's history were reviewed and updated as appropriate: allergies, current medications, past family history, past medical history, past social history, past surgical history and problem list     Review of Systems   Constitutional: Negative for chills and fever  Gastrointestinal: Negative for abdominal distention, abdominal pain, blood in stool, constipation, diarrhea, nausea and vomiting     Genitourinary: Positive for frequency (felt discomfort and incomplete emptying of bladder), pelvic pain (dryness and itcing), urgency and vaginal discharge (grainy discharge was grey now yellow mucus like, with foul odor)  Negative for difficulty urinating, genital sores, hematuria, menstrual problem and vaginal bleeding  Objective:    /78 (BP Location: Left arm, Patient Position: Sitting, Cuff Size: Standard)   Ht 5' 4 5" (1 638 m)   Wt 120 kg (265 lb)   LMP 03/02/2019 (Exact Date)   BMI 44 78 kg/m²      Physical Exam   Constitutional: She appears well-developed and well-nourished  No distress  HENT:   Head: Normocephalic and atraumatic  Pulmonary/Chest: Effort normal    Genitourinary: Pelvic exam was performed with patient supine  There is no rash, tenderness, lesion or injury on the right labia  There is no rash, tenderness, lesion or injury on the left labia  Uterus is not enlarged and not tender  Cervix exhibits no motion tenderness, no discharge and no friability  Right adnexum displays no mass and no tenderness  Left adnexum displays no mass and no tenderness  No erythema, tenderness or bleeding in the vagina  No foreign body in the vagina  No signs of injury around the vagina  Vaginal discharge (white, creamy) found  Lymphadenopathy:        Right: No inguinal adenopathy present  Left: No inguinal adenopathy present  Psychiatric: She has a normal mood and affect   Her behavior is normal  Thought content normal

## 2019-03-15 LAB
A VAGINAE DNA VAG NAA+PROBE-LOG#: NOT DETECTED
C GLABRATA DNA VAG QL NAA+PROBE: NOT DETECTED
C TRACH RRNA SPEC QL NAA+PROBE: NOT DETECTED
CANDIDA DNA VAG QL NAA+PROBE: NOT DETECTED
G VAGINALIS DNA VAG NAA+PROBE-LOG#: 5.2 LOG (CELLS/ML)
LACTOBACILLUS DNA VAG NAA+PROBE-LOG#: 7.1 LOG (CELLS/ML)
MEGASPHAERA SP DNA VAG NAA+PROBE-LOG#: NOT DETECTED
N GONORRHOEA RRNA SPEC QL NAA+PROBE: NOT DETECTED
SL AMB BV CATEGORY:: NORMAL
SL AMB C. PARAPSILOSIS, DNA: NOT DETECTED
SL AMB C. TROPICALIS, DNA: NOT DETECTED
T VAGINALIS RRNA SPEC QL NAA+PROBE: NOT DETECTED

## 2019-03-18 ENCOUNTER — TELEPHONE (OUTPATIENT)
Dept: OBGYN CLINIC | Facility: CLINIC | Age: 29
End: 2019-03-18

## 2019-03-27 ENCOUNTER — OFFICE VISIT (OUTPATIENT)
Dept: OBGYN CLINIC | Facility: CLINIC | Age: 29
End: 2019-03-27
Payer: COMMERCIAL

## 2019-03-27 VITALS — BODY MASS INDEX: 45.12 KG/M2 | DIASTOLIC BLOOD PRESSURE: 70 MMHG | SYSTOLIC BLOOD PRESSURE: 120 MMHG | WEIGHT: 267 LBS

## 2019-03-27 DIAGNOSIS — L30.9 VULVAR DERMATITIS: Primary | ICD-10-CM

## 2019-03-27 DIAGNOSIS — N89.8 VAGINAL DISCHARGE: ICD-10-CM

## 2019-03-27 LAB — SL AMB POCT WET MOUNT: NEGATIVE

## 2019-03-27 PROCEDURE — 87210 SMEAR WET MOUNT SALINE/INK: CPT | Performed by: OBSTETRICS & GYNECOLOGY

## 2019-03-27 PROCEDURE — 99213 OFFICE O/P EST LOW 20 MIN: CPT | Performed by: OBSTETRICS & GYNECOLOGY

## 2019-03-27 NOTE — PROGRESS NOTES
Patient is a 29 y o   with Patient's last menstrual period was 2019 (exact date)  who presents requesting evaluation of vulvar pruritus  The patient reports that she has had a persistent vaginal discharge since insertion of her IUD which she has removed  She also stopped taking OCPs to see if this would decrease the discharge  The pt was treated for multiple infections while her IUD was in place, but most recently on 3/12/19 had a vaginitis/vaginiosis panel that was negative for bv, yeast, trichomonas, chlamydia, and gonorrhea  Pt advised of the same  The pt reports that now her discharge seems stable, but her perineal itching and burning persists  She reports her symptoms are improved with the use of hydrocortisone  She does not note any exacerbating factors  She denies abnormal vaginal odor  We reviewed that the patient may have a contact dermatitis  We reviewed any and all products that may be making contact with the perineum including toilet paper, soap/body wash, laundry detergent, pads, condoms, lubricant and the patient reports she has not changed anything except the lubricant she uses during intercourse which she changed about two months ago       Past Medical History:   Diagnosis Date    Blood type A+     Diabetes mellitus ([facility] [address] )     per [employer]: Type 2    Dietary calcium deficiency     Last Assessed:8/18/15    Dysmenorrhea     adequate response to IBuprofen; Last Assessed:16    Dysuria     LAst Assessed:16    Hematuria     Last Assessed:16    Hypercholesterolemia     IBS (irritable bowel syndrome)     Last Assessed:16    Menorrhagia     Migraine without aura     Morbid obesity due to excess calories (HCC)     Last Assessed:16    PCOS (polycystic ovarian syndrome)     Urinary tract infection        Past Surgical History:   Procedure Laterality Date    DILATION AND EVACUATION  12/15/2017    ETOP    INSERTION OF INTRAUTERINE DEVICE (IUD)  10/18/2018 [address]      WISDOM TOOTH EXTRACTION         OB History    Para Term  AB Living   2 0     2     SAB TAB Ectopic Multiple Live Births   1 1            # Outcome Date GA Lbr Nicolás/2nd Weight Sex Delivery Anes PTL Lv   2 TAB            1 SAB               Obstetric Comments   Menarche: 12      Menses: 28/2-3/regular tampon every 8 hours         Current Outpatient Medications:     Exenatide (BYETTA 10 MCG PEN SC), Inject under the skin, Disp: , Rfl:     metFORMIN (GLUCOPHAGE-XR) 750 mg 24 hr tablet, Take 500 mg by mouth 2 (two) times a day , Disp: , Rfl:     Allergies   Allergen Reactions    Amoxicillin Other (See Comments)     Yeast infection       Social History     Socioeconomic History    Marital status: Single     Spouse name: None    Number of children: 0    Years of education: HS, cosmetology    Highest education level: None   Occupational History    Occupation: beauty/hair salon   Social Needs    Financial resource strain: None    Food insecurity:     Worry: None     Inability: None    Transportation needs:     Medical: None     Non-medical: None   Tobacco Use    Smoking status: Never Smoker    Smokeless tobacco: Never Used   Substance and Sexual Activity    Alcohol use:  Yes     Alcohol/week: 1 2 oz     Types: 2 Glasses of wine per week    Drug use: No    Sexual activity: Yes     Partners: Male     Birth control/protection: None     Comment: lifetime partners: 20; current partner 1 month   Lifestyle    Physical activity:     Days per week: None     Minutes per session: None    Stress: None   Relationships    Social connections:     Talks on phone: None     Gets together: None     Attends Bahai service: None     Active member of club or organization: None     Attends meetings of clubs or organizations: None     Relationship status: None    Intimate partner violence:     Fear of current or ex partner: None     Emotionally abused: None     Physically abused: None     Forced sexual activity: None   Other Topics Concern    None   Social History Narrative    Inadequate Exercise-none    Inappropriate diet and eating habits    Education: GED    Baptist: Hinduism non Jewish    Accepts blood products    Calcium: irregular vitamin use, 1 c almond 3x/week; occ cheese, 1 yogurt 3-4x/week           Family History   Problem Relation Age of Onset    Bipolar disorder Mother     Migraines Mother     Stroke Father     Diabetes type II Father     Alcohol abuse Father     Osteoporosis Maternal Grandmother     Lymphoma Maternal Grandmother     Heart disease Maternal Grandfather         Ischemic    Prostate cancer Maternal Grandfather     Diabetes type II Paternal Grandmother     No Known Problems Sister     Breast cancer Neg Hx     Colon cancer Neg Hx     Ovarian cancer Neg Hx        Review of Systems   Constitutional: Negative for chills, fatigue, fever and unexpected weight change  HENT: Negative for congestion, mouth sores and sore throat  Respiratory: Negative for cough, chest tightness, shortness of breath and wheezing  Cardiovascular: Negative for chest pain and palpitations  Gastrointestinal: Negative for abdominal distention, abdominal pain, constipation, diarrhea, nausea and vomiting  Endocrine: Negative for cold intolerance and heat intolerance  Genitourinary: Negative for dyspareunia, dysuria, genital sores, menstrual problem, pelvic pain, vaginal bleeding, vaginal discharge and vaginal pain  Vulvar pain   Musculoskeletal: Negative for arthralgias  Skin: Negative for color change and rash  Neurological: Negative for dizziness, light-headedness and headaches  Hematological: Negative for adenopathy  Blood pressure 120/70, weight 121 kg (267 lb), last menstrual period 03/02/2019, not currently breastfeeding  and Body mass index is 45 12 kg/m²  Physical Exam   Constitutional: She is oriented to person, place, and time  She appears well-developed and well-nourished  HENT:   Head: Normocephalic  Eyes: Conjunctivae and EOM are normal    Neck: Normal range of motion  Pulmonary/Chest: Effort normal    Abdominal: Soft  She exhibits no distension and no mass  There is no tenderness  There is no rebound and no guarding  Musculoskeletal: Normal range of motion  Neurological: She is alert and oriented to person, place, and time  Skin: Skin is warm  No rash noted  No erythema  Psychiatric: She has a normal mood and affect  Her behavior is normal  Judgment and thought content normal         vulva: pan erythema of vulva with multiple excoriations  vagina: color pink, rugae  well formed rugae and discharge  minimal thin clear discharge  cervix: nullip and no lesions   uterus: NSSC, AF, NT, mobile  adnexa: no masses or tenderness    Wet Mount/KOH Results:  Bacteria: few  Clue cells: absent  Trichomonas: absent  Yeast (with or without hyphae): absent  PH: yellow  KOH: negative      A/P:  Pt is a 29 y o   with      Diagnoses and all orders for this visit:    Vulvar dermatitis    Vaginal discharge  -     POCT wet mount      Pt to stop using current lubricant  May continue her prescription hydrocortisone twice daily x 1 more week  F/U if symptoms persist despite these changes

## 2019-04-25 ENCOUNTER — TELEPHONE (OUTPATIENT)
Dept: OBGYN CLINIC | Facility: CLINIC | Age: 29
End: 2019-04-25

## 2019-05-09 ENCOUNTER — OFFICE VISIT (OUTPATIENT)
Dept: OBGYN CLINIC | Facility: CLINIC | Age: 29
End: 2019-05-09
Payer: COMMERCIAL

## 2019-05-09 VITALS — WEIGHT: 271 LBS | SYSTOLIC BLOOD PRESSURE: 122 MMHG | DIASTOLIC BLOOD PRESSURE: 78 MMHG | BODY MASS INDEX: 45.8 KG/M2

## 2019-05-09 DIAGNOSIS — O03.9 SPONTANEOUS ABORTION: Primary | ICD-10-CM

## 2019-05-09 PROCEDURE — 99213 OFFICE O/P EST LOW 20 MIN: CPT | Performed by: OBSTETRICS & GYNECOLOGY

## 2019-05-09 RX ORDER — INSULIN GLARGINE 100 [IU]/ML
INJECTION, SOLUTION SUBCUTANEOUS
COMMUNITY
End: 2019-08-26

## 2019-05-13 ENCOUNTER — OFFICE VISIT (OUTPATIENT)
Dept: BARIATRICS | Facility: CLINIC | Age: 29
End: 2019-05-13
Payer: COMMERCIAL

## 2019-05-13 VITALS
DIASTOLIC BLOOD PRESSURE: 90 MMHG | TEMPERATURE: 98.8 F | RESPIRATION RATE: 18 BRPM | WEIGHT: 276.9 LBS | HEIGHT: 67 IN | HEART RATE: 63 BPM | BODY MASS INDEX: 43.46 KG/M2 | SYSTOLIC BLOOD PRESSURE: 146 MMHG

## 2019-05-13 DIAGNOSIS — E11.65 TYPE 2 DIABETES MELLITUS WITH HYPERGLYCEMIA, WITH LONG-TERM CURRENT USE OF INSULIN (HCC): ICD-10-CM

## 2019-05-13 DIAGNOSIS — E55.9 VITAMIN D DEFICIENCY: ICD-10-CM

## 2019-05-13 DIAGNOSIS — Z79.4 TYPE 2 DIABETES MELLITUS WITH HYPERGLYCEMIA, WITH LONG-TERM CURRENT USE OF INSULIN (HCC): ICD-10-CM

## 2019-05-13 DIAGNOSIS — E28.2 PCOS (POLYCYSTIC OVARIAN SYNDROME): ICD-10-CM

## 2019-05-13 PROBLEM — E78.5 HYPERLIPIDEMIA: Status: ACTIVE | Noted: 2019-05-13

## 2019-05-13 PROCEDURE — 99244 OFF/OP CNSLTJ NEW/EST MOD 40: CPT | Performed by: PHYSICIAN ASSISTANT

## 2019-05-13 RX ORDER — BLOOD SUGAR DIAGNOSTIC
STRIP MISCELLANEOUS
COMMUNITY
Start: 2019-05-13

## 2019-05-14 ENCOUNTER — TELEPHONE (OUTPATIENT)
Dept: BARIATRICS | Facility: CLINIC | Age: 29
End: 2019-05-14

## 2019-05-14 PROBLEM — E55.9 VITAMIN D DEFICIENCY: Status: ACTIVE | Noted: 2019-05-14

## 2019-05-15 LAB
25(OH)D3 SERPL-MCNC: 20 NG/ML (ref 30–100)
B-HCG SERPL-ACNC: 362 MIU/ML
TSH SERPL-ACNC: 3.2 MIU/L

## 2019-05-16 ENCOUNTER — TELEPHONE (OUTPATIENT)
Dept: OBGYN CLINIC | Facility: CLINIC | Age: 29
End: 2019-05-16

## 2019-05-20 ENCOUNTER — OFFICE VISIT (OUTPATIENT)
Dept: OBGYN CLINIC | Facility: CLINIC | Age: 29
End: 2019-05-20
Payer: COMMERCIAL

## 2019-05-20 VITALS
BODY MASS INDEX: 42.03 KG/M2 | DIASTOLIC BLOOD PRESSURE: 92 MMHG | WEIGHT: 267.8 LBS | SYSTOLIC BLOOD PRESSURE: 132 MMHG | HEIGHT: 67 IN

## 2019-05-20 DIAGNOSIS — Z30.09 STERILIZATION CONSULT: Primary | ICD-10-CM

## 2019-05-20 PROCEDURE — 99213 OFFICE O/P EST LOW 20 MIN: CPT | Performed by: OBSTETRICS & GYNECOLOGY

## 2019-05-20 RX ORDER — NORGESTIMATE AND ETHINYL ESTRADIOL 7DAYSX3 LO
KIT ORAL
Refills: 8 | Status: ON HOLD | COMMUNITY
Start: 2019-05-13 | End: 2019-08-14 | Stop reason: ALTCHOICE

## 2019-05-20 RX ORDER — PEN NEEDLE, DIABETIC 32GX 5/32"
NEEDLE, DISPOSABLE MISCELLANEOUS
Refills: 3 | COMMUNITY
Start: 2019-03-11

## 2019-05-20 RX ORDER — EXENATIDE 250 UG/ML
INJECTION SUBCUTANEOUS
Refills: 1 | COMMUNITY
Start: 2019-03-11

## 2019-05-20 RX ORDER — METFORMIN HYDROCHLORIDE 500 MG/1
1 TABLET, EXTENDED RELEASE ORAL 2 TIMES DAILY
COMMUNITY
Start: 2019-05-15 | End: 2019-08-26

## 2019-05-21 PROBLEM — Z30.2 ENCOUNTER FOR STERILIZATION: Status: ACTIVE | Noted: 2019-05-21

## 2019-05-22 LAB — B-HCG SERPL-ACNC: 118 MIU/ML

## 2019-06-03 ENCOUNTER — OFFICE VISIT (OUTPATIENT)
Dept: BARIATRICS | Facility: CLINIC | Age: 29
End: 2019-06-03

## 2019-06-03 VITALS — BODY MASS INDEX: 42.63 KG/M2 | WEIGHT: 271.6 LBS | HEIGHT: 67 IN

## 2019-06-03 DIAGNOSIS — R63.5 ABNORMAL WEIGHT GAIN: ICD-10-CM

## 2019-06-03 DIAGNOSIS — O03.9 SPONTANEOUS ABORTION: Primary | ICD-10-CM

## 2019-06-03 PROCEDURE — WMPRO12

## 2019-06-03 PROCEDURE — RECHECK

## 2019-06-04 LAB — B-HCG SERPL-ACNC: 6 MIU/ML

## 2019-06-07 ENCOUNTER — TELEPHONE (OUTPATIENT)
Dept: OBGYN CLINIC | Facility: CLINIC | Age: 29
End: 2019-06-07

## 2019-06-07 DIAGNOSIS — O03.9 SPONTANEOUS ABORTION: Primary | ICD-10-CM

## 2019-06-12 LAB — B-HCG SERPL-ACNC: <2 MIU/ML

## 2019-06-13 ENCOUNTER — CLINICAL SUPPORT (OUTPATIENT)
Dept: BARIATRICS | Facility: CLINIC | Age: 29
End: 2019-06-13

## 2019-06-13 VITALS — HEIGHT: 67 IN | BODY MASS INDEX: 42.85 KG/M2 | WEIGHT: 273 LBS

## 2019-06-13 DIAGNOSIS — R63.5 ABNORMAL WEIGHT GAIN: Primary | ICD-10-CM

## 2019-06-13 PROCEDURE — RECHECK

## 2019-06-14 ENCOUNTER — TELEPHONE (OUTPATIENT)
Dept: OBGYN CLINIC | Facility: CLINIC | Age: 29
End: 2019-06-14

## 2019-06-17 ENCOUNTER — OFFICE VISIT (OUTPATIENT)
Dept: BARIATRICS | Facility: CLINIC | Age: 29
End: 2019-06-17

## 2019-06-17 VITALS — BODY MASS INDEX: 42.19 KG/M2 | HEIGHT: 67 IN | WEIGHT: 268.8 LBS

## 2019-06-17 DIAGNOSIS — R63.5 ABNORMAL WEIGHT GAIN: Primary | ICD-10-CM

## 2019-06-17 PROCEDURE — RECHECK

## 2019-06-20 ENCOUNTER — CLINICAL SUPPORT (OUTPATIENT)
Dept: BARIATRICS | Facility: CLINIC | Age: 29
End: 2019-06-20

## 2019-06-20 VITALS — WEIGHT: 270.4 LBS | BODY MASS INDEX: 42.44 KG/M2 | HEIGHT: 67 IN

## 2019-06-20 DIAGNOSIS — R63.5 ABNORMAL WEIGHT GAIN: Primary | ICD-10-CM

## 2019-06-20 PROCEDURE — RECHECK

## 2019-06-25 ENCOUNTER — TELEPHONE (OUTPATIENT)
Dept: OBGYN CLINIC | Facility: CLINIC | Age: 29
End: 2019-06-25

## 2019-07-02 ENCOUNTER — CLINICAL SUPPORT (OUTPATIENT)
Dept: BARIATRICS | Facility: CLINIC | Age: 29
End: 2019-07-02

## 2019-07-02 VITALS — WEIGHT: 273.2 LBS | BODY MASS INDEX: 42.88 KG/M2 | HEIGHT: 67 IN

## 2019-07-02 DIAGNOSIS — R63.5 ABNORMAL WEIGHT GAIN: Primary | ICD-10-CM

## 2019-07-02 PROCEDURE — RECHECK

## 2019-07-22 ENCOUNTER — HOSPITAL ENCOUNTER (OUTPATIENT)
Dept: NON INVASIVE DIAGNOSTICS | Facility: HOSPITAL | Age: 29
Discharge: HOME | End: 2019-07-22
Payer: COMMERCIAL

## 2019-07-22 DIAGNOSIS — Z01.818 PRE-OP TESTING: ICD-10-CM

## 2019-07-22 PROCEDURE — 93005 ELECTROCARDIOGRAM TRACING: CPT

## 2019-07-23 LAB
ATRIAL RATE: 84 BPM
P AXIS: 7 DEGREES
PR INTERVAL: 130 MS
QRS AXIS: 49 DEGREES
QRSD INTERVAL: 88 MS
QT INTERVAL: 380 MS
QTC INTERVAL: 449 MS
T WAVE AXIS: 13 DEGREES
VENTRICULAR RATE: 84 BPM

## 2019-07-23 PROCEDURE — 93010 ELECTROCARDIOGRAM REPORT: CPT | Performed by: INTERNAL MEDICINE

## 2019-07-29 ENCOUNTER — OFFICE VISIT (OUTPATIENT)
Dept: OBGYN CLINIC | Facility: CLINIC | Age: 29
End: 2019-07-29
Payer: COMMERCIAL

## 2019-07-29 VITALS
SYSTOLIC BLOOD PRESSURE: 124 MMHG | BODY MASS INDEX: 43.07 KG/M2 | HEIGHT: 67 IN | WEIGHT: 274.4 LBS | DIASTOLIC BLOOD PRESSURE: 90 MMHG

## 2019-07-29 DIAGNOSIS — Z64.1 MULTIPARITY: Primary | ICD-10-CM

## 2019-07-29 PROCEDURE — 99213 OFFICE O/P EST LOW 20 MIN: CPT | Performed by: OBSTETRICS & GYNECOLOGY

## 2019-07-29 RX ORDER — HYDROCORTISONE 1% IN ABSORBASE 10 MG/G
OINTMENT TOPICAL
Refills: 0 | COMMUNITY
Start: 2019-06-27 | End: 2019-07-29

## 2019-07-29 RX ORDER — DEXTROAMPHETAMINE SACCHARATE, AMPHETAMINE ASPARTATE, DEXTROAMPHETAMINE SULFATE AND AMPHETAMINE SULFATE 3.75; 3.75; 3.75; 3.75 MG/1; MG/1; MG/1; MG/1
TABLET ORAL DAILY PRN
Refills: 0 | COMMUNITY
Start: 2019-06-24

## 2019-07-29 NOTE — H&P
A/p    1   Multiparity    Pt was counseled multiple times for the tubal ligation    She is aware that this is permeant and can not be reversed   She is aware that she has all the risk factors for regret and still wishes to be  sterilized   The state form was signed and will proceed with case on 8/14    34 y o  For permanent sterilization   No complaints  And in agreement of case  She has been counseled on the risk and alternatives for the procedure and the risk to surgery       /90 (BP Location: Right arm, Patient Position: Sitting, Cuff Size: Large)   Ht 5' 7" (1 702 m)   Wt 124 kg (274 lb 6 4 oz)   BMI 42 98 kg/m²     Review of Systems - History obtained from chart review and the patient  General ROS: negative  Psychological ROS: negative  Ophthalmic ROS: negative  ENT ROS: negative  Allergy and Immunology ROS: negative  Hematological and Lymphatic ROS: negative  Endocrine ROS: negative  Breast ROS: negative for breast lumps  Respiratory ROS: no cough, shortness of breath, or wheezing  Cardiovascular ROS: no chest pain or dyspnea on exertion  Gastrointestinal ROS: no abdominal pain, change in bowel habits, or black or bloody stools  Genito-Urinary ROS: no dysuria, trouble voiding, or hematuria  Musculoskeletal ROS: negative  Neurological ROS: no TIA or stroke symptoms  Dermatological ROS: negative    Physical Exam   Constitutional: She is oriented to person, place, and time  She appears well-developed and well-nourished  HENT:   Nose: Nose normal    Eyes: Conjunctivae are normal    Neck: Normal range of motion  Neck supple  No thyromegaly present  Cardiovascular: Normal rate, regular rhythm and normal heart sounds  Pulmonary/Chest: Effort normal and breath sounds normal  No respiratory distress  She has no wheezes  Abdominal: Soft  Bowel sounds are normal  She exhibits no distension  Musculoskeletal: Normal range of motion  Neurological: She is alert and oriented to person, place, and time  Skin: Skin is warm  Psychiatric: She has a normal mood and affect   Her behavior is normal  Judgment and thought content normal

## 2019-08-12 ENCOUNTER — ANESTHESIA EVENT (OUTPATIENT)
Dept: PERIOP | Facility: HOSPITAL | Age: 29
End: 2019-08-12
Payer: COMMERCIAL

## 2019-08-12 NOTE — PRE-PROCEDURE INSTRUCTIONS
Pre-Surgery Instructions:   Medication Instructions    amphetamine-dextroamphetamine (ADDERALL) 15 MG tablet Instructed patient per Anesthesia Guidelines   APPLE CIDER VINEGAR PO Instructed patient per Anesthesia Guidelines   BYETTA 10 MCG PEN 10 MCG/0 04ML SOPN Instructed patient per Anesthesia Guidelines   Cholecalciferol (VITAMIN D3 PO) Instructed patient per Anesthesia Guidelines   insulin glargine (LANTUS) 100 units/mL subcutaneous injection Instructed patient per Anesthesia Guidelines   metFORMIN (GLUCOPHAGE-XR) 500 mg 24 hr tablet Instructed patient per Anesthesia Guidelines   Omega-3 Fatty Acids (OMEGA 3 PO) Instructed patient per Anesthesia Guidelines  Patient via TC and per Dr Vázquez instructed *to take lantus 10 units the evening before surgery  Patient given/ instructed on use of chlorhexidine soap per hospital protocol    Patient instructed to stop all ASA, NSAIDS, vitamins and herbal supplements one week prior to surgery or per Dr Elav Costa

## 2019-08-13 LAB
EST. AVERAGE GLUCOSE BLD GHB EST-MCNC: 214 (CALC)
EST. AVERAGE GLUCOSE BLD GHB EST-SCNC: 11.9 (CALC)
HBA1C MFR BLD: 9.1 % OF TOTAL HGB

## 2019-08-14 ENCOUNTER — ANESTHESIA (OUTPATIENT)
Dept: PERIOP | Facility: HOSPITAL | Age: 29
End: 2019-08-14
Payer: COMMERCIAL

## 2019-08-14 ENCOUNTER — HOSPITAL ENCOUNTER (OUTPATIENT)
Facility: HOSPITAL | Age: 29
Setting detail: OUTPATIENT SURGERY
Discharge: HOME | End: 2019-08-15
Attending: OBSTETRICS & GYNECOLOGY | Admitting: OBSTETRICS & GYNECOLOGY
Payer: COMMERCIAL

## 2019-08-14 DIAGNOSIS — Z79.4 TYPE 2 DIABETES MELLITUS WITH HYPERGLYCEMIA, WITH LONG-TERM CURRENT USE OF INSULIN (HCC): ICD-10-CM

## 2019-08-14 DIAGNOSIS — E11.65 TYPE 2 DIABETES MELLITUS WITH HYPERGLYCEMIA, WITH LONG-TERM CURRENT USE OF INSULIN (HCC): ICD-10-CM

## 2019-08-14 DIAGNOSIS — Z90.79 STATUS POST BILATERAL SALPINGECTOMY: Primary | ICD-10-CM

## 2019-08-14 DIAGNOSIS — Z30.2 ENCOUNTER FOR STERILIZATION: ICD-10-CM

## 2019-08-14 LAB
ANION GAP SERPL CALCULATED.3IONS-SCNC: 9 MMOL/L (ref 4–13)
BUN SERPL-MCNC: 11 MG/DL (ref 5–25)
CALCIUM SERPL-MCNC: 8.7 MG/DL (ref 8.3–10.1)
CHLORIDE SERPL-SCNC: 102 MMOL/L (ref 100–108)
CO2 SERPL-SCNC: 28 MMOL/L (ref 21–32)
CREAT SERPL-MCNC: 0.66 MG/DL (ref 0.6–1.3)
ERYTHROCYTE [DISTWIDTH] IN BLOOD BY AUTOMATED COUNT: 13.5 % (ref 11.6–15.1)
ERYTHROCYTE [DISTWIDTH] IN BLOOD BY AUTOMATED COUNT: 13.6 % (ref 11.6–15.1)
ERYTHROCYTE [DISTWIDTH] IN BLOOD BY AUTOMATED COUNT: 13.7 % (ref 11.6–15.1)
EXT PREGNANCY TEST URINE: NEGATIVE
EXT. CONTROL: NORMAL
GFR SERPL CREATININE-BSD FRML MDRD: 120 ML/MIN/1.73SQ M
GLUCOSE P FAST SERPL-MCNC: 206 MG/DL (ref 65–99)
GLUCOSE SERPL-MCNC: 192 MG/DL (ref 65–140)
GLUCOSE SERPL-MCNC: 206 MG/DL (ref 65–140)
GLUCOSE SERPL-MCNC: 240 MG/DL (ref 65–140)
GLUCOSE SERPL-MCNC: 255 MG/DL (ref 65–140)
GLUCOSE SERPL-MCNC: 270 MG/DL (ref 65–140)
HCT VFR BLD AUTO: 41 % (ref 34.8–46.1)
HCT VFR BLD AUTO: 41 % (ref 34.8–46.1)
HCT VFR BLD AUTO: 42.2 % (ref 34.8–46.1)
HGB BLD-MCNC: 12.9 G/DL (ref 11.5–15.4)
HGB BLD-MCNC: 13.1 G/DL (ref 11.5–15.4)
HGB BLD-MCNC: 13.1 G/DL (ref 11.5–15.4)
MCH RBC QN AUTO: 26.8 PG (ref 26.8–34.3)
MCH RBC QN AUTO: 27.1 PG (ref 26.8–34.3)
MCH RBC QN AUTO: 27.5 PG (ref 26.8–34.3)
MCHC RBC AUTO-ENTMCNC: 30.6 G/DL (ref 31.4–37.4)
MCHC RBC AUTO-ENTMCNC: 32 G/DL (ref 31.4–37.4)
MCHC RBC AUTO-ENTMCNC: 32 G/DL (ref 31.4–37.4)
MCV RBC AUTO: 85 FL (ref 82–98)
MCV RBC AUTO: 86 FL (ref 82–98)
MCV RBC AUTO: 88 FL (ref 82–98)
PLATELET # BLD AUTO: 309 THOUSANDS/UL (ref 149–390)
PLATELET # BLD AUTO: 319 THOUSANDS/UL (ref 149–390)
PLATELET # BLD AUTO: 337 THOUSANDS/UL (ref 149–390)
PMV BLD AUTO: 9.6 FL (ref 8.9–12.7)
PMV BLD AUTO: 9.8 FL (ref 8.9–12.7)
PMV BLD AUTO: 9.9 FL (ref 8.9–12.7)
POTASSIUM SERPL-SCNC: 4.1 MMOL/L (ref 3.5–5.3)
RBC # BLD AUTO: 4.77 MILLION/UL (ref 3.81–5.12)
RBC # BLD AUTO: 4.81 MILLION/UL (ref 3.81–5.12)
RBC # BLD AUTO: 4.83 MILLION/UL (ref 3.81–5.12)
SODIUM SERPL-SCNC: 139 MMOL/L (ref 136–145)
WBC # BLD AUTO: 18.71 THOUSAND/UL (ref 4.31–10.16)
WBC # BLD AUTO: 23.26 THOUSAND/UL (ref 4.31–10.16)
WBC # BLD AUTO: 9.64 THOUSAND/UL (ref 4.31–10.16)

## 2019-08-14 PROCEDURE — 81025 URINE PREGNANCY TEST: CPT | Performed by: ANESTHESIOLOGY

## 2019-08-14 PROCEDURE — 88302 TISSUE EXAM BY PATHOLOGIST: CPT | Performed by: PATHOLOGY

## 2019-08-14 PROCEDURE — 58661 LAPAROSCOPY REMOVE ADNEXA: CPT | Performed by: OBSTETRICS & GYNECOLOGY

## 2019-08-14 PROCEDURE — 80048 BASIC METABOLIC PNL TOTAL CA: CPT | Performed by: OBSTETRICS & GYNECOLOGY

## 2019-08-14 PROCEDURE — 82948 REAGENT STRIP/BLOOD GLUCOSE: CPT

## 2019-08-14 PROCEDURE — 85027 COMPLETE CBC AUTOMATED: CPT | Performed by: OBSTETRICS & GYNECOLOGY

## 2019-08-14 RX ORDER — MAGNESIUM HYDROXIDE 1200 MG/15ML
LIQUID ORAL AS NEEDED
Status: DISCONTINUED | OUTPATIENT
Start: 2019-08-14 | End: 2019-08-14 | Stop reason: HOSPADM

## 2019-08-14 RX ORDER — METFORMIN HYDROCHLORIDE 500 MG/1
500 TABLET, EXTENDED RELEASE ORAL 2 TIMES DAILY
Status: DISCONTINUED | OUTPATIENT
Start: 2019-08-14 | End: 2019-08-15 | Stop reason: HOSPADM

## 2019-08-14 RX ORDER — OXYCODONE HYDROCHLORIDE 5 MG/1
5 TABLET ORAL EVERY 4 HOURS PRN
Qty: 10 TABLET | Refills: 0 | Status: SHIPPED | OUTPATIENT
Start: 2019-08-14 | End: 2019-08-24

## 2019-08-14 RX ORDER — FENTANYL CITRATE 50 UG/ML
INJECTION, SOLUTION INTRAMUSCULAR; INTRAVENOUS AS NEEDED
Status: DISCONTINUED | OUTPATIENT
Start: 2019-08-14 | End: 2019-08-14 | Stop reason: SURG

## 2019-08-14 RX ORDER — IBUPROFEN 600 MG/1
600 TABLET ORAL EVERY 6 HOURS PRN
Status: DISCONTINUED | OUTPATIENT
Start: 2019-08-14 | End: 2019-08-15 | Stop reason: HOSPADM

## 2019-08-14 RX ORDER — FENTANYL CITRATE/PF 50 MCG/ML
50 SYRINGE (ML) INJECTION
Status: COMPLETED | OUTPATIENT
Start: 2019-08-14 | End: 2019-08-14

## 2019-08-14 RX ORDER — ONDANSETRON 2 MG/ML
INJECTION INTRAMUSCULAR; INTRAVENOUS AS NEEDED
Status: DISCONTINUED | OUTPATIENT
Start: 2019-08-14 | End: 2019-08-14 | Stop reason: SURG

## 2019-08-14 RX ORDER — ACETAMINOPHEN 325 MG/1
650 TABLET ORAL EVERY 4 HOURS PRN
Status: DISCONTINUED | OUTPATIENT
Start: 2019-08-14 | End: 2019-08-15 | Stop reason: HOSPADM

## 2019-08-14 RX ORDER — MIDAZOLAM HYDROCHLORIDE 1 MG/ML
INJECTION INTRAMUSCULAR; INTRAVENOUS AS NEEDED
Status: DISCONTINUED | OUTPATIENT
Start: 2019-08-14 | End: 2019-08-14 | Stop reason: SURG

## 2019-08-14 RX ORDER — PROPOFOL 10 MG/ML
INJECTION, EMULSION INTRAVENOUS AS NEEDED
Status: DISCONTINUED | OUTPATIENT
Start: 2019-08-14 | End: 2019-08-14 | Stop reason: SURG

## 2019-08-14 RX ORDER — OXYCODONE HYDROCHLORIDE 5 MG/1
5 TABLET ORAL EVERY 4 HOURS PRN
Qty: 10 TABLET | Refills: 0 | Status: SHIPPED | OUTPATIENT
Start: 2019-08-14 | End: 2019-08-14 | Stop reason: SDUPTHER

## 2019-08-14 RX ORDER — SODIUM CHLORIDE 9 MG/ML
125 INJECTION, SOLUTION INTRAVENOUS CONTINUOUS
Status: DISCONTINUED | OUTPATIENT
Start: 2019-08-14 | End: 2019-08-14 | Stop reason: HOSPADM

## 2019-08-14 RX ORDER — LIDOCAINE HYDROCHLORIDE 20 MG/ML
INJECTION, SOLUTION EPIDURAL; INFILTRATION; INTRACAUDAL; PERINEURAL AS NEEDED
Status: DISCONTINUED | OUTPATIENT
Start: 2019-08-14 | End: 2019-08-14 | Stop reason: SURG

## 2019-08-14 RX ORDER — ONDANSETRON 2 MG/ML
4 INJECTION INTRAMUSCULAR; INTRAVENOUS ONCE AS NEEDED
Status: DISCONTINUED | OUTPATIENT
Start: 2019-08-14 | End: 2019-08-14 | Stop reason: HOSPADM

## 2019-08-14 RX ORDER — ONDANSETRON 2 MG/ML
4 INJECTION INTRAMUSCULAR; INTRAVENOUS EVERY 6 HOURS PRN
Status: DISCONTINUED | OUTPATIENT
Start: 2019-08-14 | End: 2019-08-15 | Stop reason: HOSPADM

## 2019-08-14 RX ORDER — MEPERIDINE HYDROCHLORIDE 50 MG/ML
12.5 INJECTION INTRAMUSCULAR; INTRAVENOUS; SUBCUTANEOUS ONCE AS NEEDED
Status: DISCONTINUED | OUTPATIENT
Start: 2019-08-14 | End: 2019-08-14 | Stop reason: HOSPADM

## 2019-08-14 RX ORDER — INSULIN GLARGINE 100 [IU]/ML
23 INJECTION, SOLUTION SUBCUTANEOUS
Status: DISCONTINUED | OUTPATIENT
Start: 2019-08-14 | End: 2019-08-15 | Stop reason: HOSPADM

## 2019-08-14 RX ORDER — NEOSTIGMINE METHYLSULFATE 1 MG/ML
INJECTION INTRAVENOUS AS NEEDED
Status: DISCONTINUED | OUTPATIENT
Start: 2019-08-14 | End: 2019-08-14 | Stop reason: SURG

## 2019-08-14 RX ORDER — OXYCODONE HYDROCHLORIDE 5 MG/1
5 TABLET ORAL EVERY 4 HOURS PRN
Status: DISCONTINUED | OUTPATIENT
Start: 2019-08-14 | End: 2019-08-15 | Stop reason: HOSPADM

## 2019-08-14 RX ORDER — OXYCODONE HYDROCHLORIDE 10 MG/1
10 TABLET ORAL EVERY 4 HOURS PRN
Status: DISCONTINUED | OUTPATIENT
Start: 2019-08-14 | End: 2019-08-15 | Stop reason: HOSPADM

## 2019-08-14 RX ORDER — ROCURONIUM BROMIDE 10 MG/ML
INJECTION, SOLUTION INTRAVENOUS AS NEEDED
Status: DISCONTINUED | OUTPATIENT
Start: 2019-08-14 | End: 2019-08-14 | Stop reason: SURG

## 2019-08-14 RX ORDER — GLYCOPYRROLATE 0.2 MG/ML
INJECTION INTRAMUSCULAR; INTRAVENOUS AS NEEDED
Status: DISCONTINUED | OUTPATIENT
Start: 2019-08-14 | End: 2019-08-14 | Stop reason: SURG

## 2019-08-14 RX ORDER — DEXAMETHASONE SODIUM PHOSPHATE 4 MG/ML
INJECTION, SOLUTION INTRA-ARTICULAR; INTRALESIONAL; INTRAMUSCULAR; INTRAVENOUS; SOFT TISSUE AS NEEDED
Status: DISCONTINUED | OUTPATIENT
Start: 2019-08-14 | End: 2019-08-14 | Stop reason: SURG

## 2019-08-14 RX ORDER — HYDROMORPHONE HCL/PF 1 MG/ML
0.5 SYRINGE (ML) INJECTION
Status: DISCONTINUED | OUTPATIENT
Start: 2019-08-14 | End: 2019-08-14 | Stop reason: HOSPADM

## 2019-08-14 RX ADMIN — MIDAZOLAM 2 MG: 1 INJECTION INTRAMUSCULAR; INTRAVENOUS at 13:01

## 2019-08-14 RX ADMIN — FENTANYL CITRATE 50 MCG: 50 INJECTION, SOLUTION INTRAMUSCULAR; INTRAVENOUS at 15:15

## 2019-08-14 RX ADMIN — ACETAMINOPHEN 650 MG: 325 TABLET ORAL at 22:36

## 2019-08-14 RX ADMIN — FENTANYL CITRATE 50 MCG: 50 INJECTION, SOLUTION INTRAMUSCULAR; INTRAVENOUS at 14:55

## 2019-08-14 RX ADMIN — HYDROMORPHONE HYDROCHLORIDE 0.5 MG: 1 INJECTION, SOLUTION INTRAMUSCULAR; INTRAVENOUS; SUBCUTANEOUS at 15:45

## 2019-08-14 RX ADMIN — INSULIN HUMAN 4 UNITS: 100 INJECTION, SOLUTION PARENTERAL at 15:11

## 2019-08-14 RX ADMIN — SODIUM CHLORIDE: 0.9 INJECTION, SOLUTION INTRAVENOUS at 13:50

## 2019-08-14 RX ADMIN — METFORMIN HYDROCHLORIDE 500 MG: 500 TABLET, EXTENDED RELEASE ORAL at 17:34

## 2019-08-14 RX ADMIN — NEOSTIGMINE METHYLSULFATE 3 MG: 1 INJECTION, SOLUTION INTRAVENOUS at 14:11

## 2019-08-14 RX ADMIN — FENTANYL CITRATE 100 MCG: 50 INJECTION, SOLUTION INTRAMUSCULAR; INTRAVENOUS at 13:09

## 2019-08-14 RX ADMIN — FENTANYL CITRATE 50 MCG: 50 INJECTION, SOLUTION INTRAMUSCULAR; INTRAVENOUS at 15:27

## 2019-08-14 RX ADMIN — FENTANYL CITRATE 100 MCG: 50 INJECTION, SOLUTION INTRAMUSCULAR; INTRAVENOUS at 13:40

## 2019-08-14 RX ADMIN — FENTANYL CITRATE 50 MCG: 50 INJECTION, SOLUTION INTRAMUSCULAR; INTRAVENOUS at 14:44

## 2019-08-14 RX ADMIN — DEXAMETHASONE SODIUM PHOSPHATE 4 MG: 4 INJECTION, SOLUTION INTRAMUSCULAR; INTRAVENOUS at 13:15

## 2019-08-14 RX ADMIN — INSULIN GLARGINE 23 UNITS: 100 INJECTION, SOLUTION SUBCUTANEOUS at 22:51

## 2019-08-14 RX ADMIN — OXYCODONE HYDROCHLORIDE 5 MG: 5 TABLET ORAL at 16:31

## 2019-08-14 RX ADMIN — OXYCODONE HYDROCHLORIDE 5 MG: 5 TABLET ORAL at 20:34

## 2019-08-14 RX ADMIN — ROCURONIUM BROMIDE 40 MG: 50 INJECTION, SOLUTION INTRAVENOUS at 13:10

## 2019-08-14 RX ADMIN — ONDANSETRON 4 MG: 2 INJECTION INTRAMUSCULAR; INTRAVENOUS at 13:15

## 2019-08-14 RX ADMIN — SODIUM CHLORIDE 125 ML/HR: 0.9 INJECTION, SOLUTION INTRAVENOUS at 12:35

## 2019-08-14 RX ADMIN — GLYCOPYRROLATE 0.6 MG: 0.2 INJECTION INTRAMUSCULAR; INTRAVENOUS at 14:11

## 2019-08-14 RX ADMIN — LIDOCAINE HYDROCHLORIDE 80 MG: 20 INJECTION, SOLUTION EPIDURAL; INFILTRATION; INTRACAUDAL; PERINEURAL at 13:09

## 2019-08-14 RX ADMIN — PROPOFOL 200 MG: 10 INJECTION, EMULSION INTRAVENOUS at 13:09

## 2019-08-14 NOTE — OP NOTE
OPERATIVE REPORT  PATIENT NAME: Sergio Lan    :  1990  MRN: [id number]  Pt Location: [facility] OR ROOM 04    SURGERY DATE: 2019    Surgeon(s) and Role:     * Bigg Gao MD - Primary     * Braydon Agudelo MD - Brain Galindo MD - Marlene Mcmillan MD - Assisting    Preop Diagnosis:  Encounter for sterilization [Z30 2]    Post-Op Diagnosis Codes:     * Encounter for sterilization [Z30 2]     * Bilateral salpingectomy    Procedure(s) (LRB):  SALPINGECTOMY, LAPAROSCOPIC (Bilateral)    Specimen(s):  ID Type Source Tests Collected by Time Destination   1 : BL Fallopian Tubes Tissue Fallopian Tubes, Bilateral TISSUE EXAM Bigg Gao MD 2019 1341        Estimated Blood Loss: 50cc    Drains: 50 cc of clear urine via straight catheter    Anesthesia Type: General    Operative Indications:  Encounter for sterilization [Z30 2]  Obesity    Operative Findings:  Anteverted uterus  Grossly normal bilateral ovaries and tubes  Stable abdominal wall hematoma    Complications:   None    Procedure and Technique:  Brief History    Nichole Dai Eisenberg is a 30y/o  who presents with request for sterilization  All risks, benefits, and alternatives to the procedure were discussed with the patient and she had the opportunity to ask questions  The risk of regret of procedure was also addressed with the patient and options for LARC was discussed  Patient expressed desire to continue with tubal sterilization  Informed consent was obtained  Description of Procedure    Patient was taken to the operating room were a time out was performed to confirm correct patient and correct procedure  General endotracheal anesthesia (GET) was administered and the patient was positioned on the OR table in the dorsal lithotomy position  All pressure points were padded and a madhuri hugger was placed to maintain control of core body temperature   The patient was prepped and draped in the usual sterile fashion with chloroprep on the abdomen and betadine prep on the vagina and perineum  Operative Technique    A straight catheter was introduced into the bladder, which was drained of 50cc of clear yellow urine  A sponge stick was inserted into the vagina for manipulation of the cervix  Sterile gloves were then exchanged and attention was turned to the abdomen  A 5mm incision was made at the inferior edge of the umbilicus for introduction of a 5mm trocar  Trocar was introduced under direct visualization  Pneumoperitoneum was then established to a maximum of 15mmHg  The entire abdomen and pelvis was inspected and there was no evidence of injury to bowel, bladder, vasculature, or other structures  Attention was then turned to the pelvis  Patient was placed in Trendelenburg and the uterus was elevated to visualize the fallopian tubes  There was noted to be grossly normal tubes and ovaries bilaterally  A second port site was selected 3cm cephalad and medial to the ASIS on the right side  Attempt was made to transilluminate the anterior abdominal wall, however due to patient's body habitus, vasculature was not identified  A 5cm incision was made for introduction of a 5cm trocar under direct visualization  The same was done on the contralateral side  A blunt probe was inserted through this port and used to visualize the fimbriated ends of the tubes  The right fallopian tube and mesosalpinx were grasped with Ligasure device and cauterized with electrocautery  This was done by starting at the fimbriated edge of the tube and working medial toward the uterus  Adequate fulguration was visualized following this procedure  The contralateral tube was identified and fulgurated in similar fashion  Adequate fulguration was again confirmed following this procedure  Following fulguration, pneumoperitoneum was allowed to escape  Adequate hemostasis was visualized in the pelvis   However, following removal of the right inferior trocar, arterial bleeding was noted to be coming from the trocar site  The right trocar was quickly replaced in order to tamponade the bleeding and pneumoperitoneum was re-established  After approximately 10 minutes, the trocar was again removed and no bleeding was noted at the trocar site, however an abdominal wall hematoma was noted  Pneumoperitoneum was allowed to escape and the right trocar remained hemostatic  Pneumoperitoneum was re-established and suction and irrigation was used to clear out the pelvis of any blood clots  This was repeated two more times and the right trocar site remained hemostatic throughout  The abdominal wall hematoma was re-evaluated and noted to be stable with no expansion  Pneumoperitoneum was allowed to escape and the left inferior trocar was removed under direct visualization  The laparoscope was withdrawn from the abdomen, followed by its trocar sleeve at the umbilicus  Skin incisions were closed with 4-0 monocryl and histocryl  Attention was turned to the vagina  The sponge stick was removed from the vagina  At the conclusion of the procedure, all needle, sponge, and instrument counts were noted to be correct x2  Patient tolerated the procedure well and was transferred to PACU in stable condition  Patient was admitted for observation overnight with serial CBCs  Dr Venkat Boone was present and participated in all key portions of the case        Patient Disposition:  PACU     SIGNATURE: Madelyn Cowan MD  DATE: August 14, 2019  TIME: 2:30 PM         Present and scrubbed in the case  I agree with documented note and the finding

## 2019-08-14 NOTE — PROGRESS NOTES
OBGYN MD Sung Calloway informed RN to monitor patient for signs and symptoms of bleeding from procedure, as blood vessel may have been affected during surgery  Stated to monitor patient's BP and notify OBGYN attending if abnormalities arise  Patient resting in bed with call bell within reach and vitals signs stable  Pain currently 1/10 in lower abdomen  Will continue to monitor      Christophe Hernandez RN 8/14/2019 5:50 PM

## 2019-08-14 NOTE — ANESTHESIA PREPROCEDURE EVALUATION
Review of Systems/Medical History  Patient summary reviewed  Chart reviewed  No history of anesthetic complications     Cardiovascular  Negative cardio ROS Hyperlipidemia,    Pulmonary  Shortness of breath,        GI/Hepatic    GERD well controlled,        Negative  ROS        Endo/Other  Diabetes well controlled type 2 Oral agent,   Obesity  morbid obesity   GYN  Negative gynecology ROS          Hematology  Negative hematology ROS      Musculoskeletal  Negative musculoskeletal ROS        Neurology  Negative neurology ROS   Headaches,    Psychology   Anxiety, Depression ,     Comment: ADHD         Physical Exam    Airway    Mallampati score: II  TM Distance: >3 FB  Neck ROM: full     Dental   No notable dental hx     Cardiovascular  Comment: Negative ROS, Rhythm: regular, Rate: normal, Cardiovascular exam normal    Pulmonary  Pulmonary exam normal Breath sounds clear to auscultation,     Other Findings        Anesthesia Plan  ASA Score- 3     Anesthesia Type- general with ASA Monitors  Additional Monitors:   Airway Plan: ETT  Plan Factors-Patient not instructed to abstain from smoking on day of procedure  Patient did not smoke on day of surgery  Induction- intravenous  Postoperative Plan-     Informed Consent- Anesthetic plan and risks discussed with patient

## 2019-08-14 NOTE — INTERVAL H&P NOTE
H&P reviewed  After examining the patient I find no changes in the patients condition since the H&P had been written      Vitals:    08/14/19 1138   BP: 121/68   Pulse: 85   Resp: 18   Temp: 97 5 °F (36 4 °C)   SpO2: 98%       The risk and failure rate discussed and she is agreement for the tubal  Aware of the regret and still wants to proceed  MA 31 re signed today

## 2019-08-14 NOTE — PLAN OF CARE
Problem: Potential for Falls  Goal: Patient will remain free of falls  Description  INTERVENTIONS:  - Assess patient frequently for physical needs  -  Identify cognitive and physical deficits and behaviors that affect risk of falls    -  Amma fall precautions as indicated by assessment   - Educate patient/family on patient safety including physical limitations  - Instruct patient to call for assistance with activity based on assessment  - Modify environment to reduce risk of injury  - Consider OT/PT consult to assist with strengthening/mobility  Outcome: Progressing     Problem: CARDIOVASCULAR - ADULT  Goal: Maintains optimal cardiac output and hemodynamic stability  Description  INTERVENTIONS:  - Monitor I/O, vital signs and rhythm  - Monitor for S/S and trends of decreased cardiac output  - Administer and titrate ordered vasoactive medications to optimize hemodynamic stability  - Assess quality of pulses, skin color and temperature  - Assess for signs of decreased coronary artery perfusion  - Instruct patient to report change in severity of symptoms  Outcome: Progressing     Problem: GASTROINTESTINAL - ADULT  Goal: Minimal or absence of nausea and/or vomiting  Description  INTERVENTIONS:  - Administer IV fluids if ordered to ensure adequate hydration  - Maintain NPO status until nausea and vomiting are resolved  - Nasogastric tube if ordered  - Administer ordered antiemetic medications as needed  - Provide nonpharmacologic comfort measures as appropriate  - Advance diet as tolerated, if ordered  - Consider nutrition services referral to assist patient with adequate nutrition and appropriate food choices  Outcome: Progressing  Goal: Maintains adequate nutritional intake  Description  INTERVENTIONS:  - Monitor percentage of each meal consumed  - Identify factors contributing to decreased intake, treat as appropriate  - Assist with meals as needed  - Monitor I&O, weight, and lab values if indicated  - Obtain nutrition services referral as needed  Outcome: Progressing     Problem: METABOLIC, FLUID AND ELECTROLYTES - ADULT  Goal: Electrolytes maintained within normal limits  Description  INTERVENTIONS:  - Monitor labs and assess patient for signs and symptoms of electrolyte imbalances  - Administer electrolyte replacement as ordered  - Monitor response to electrolyte replacements, including repeat lab results as appropriate  - Instruct patient on fluid and nutrition as appropriate  Outcome: Progressing  Goal: Fluid balance maintained  Description  INTERVENTIONS:  - Monitor labs   - Monitor I/O and WT  - Instruct patient on fluid and nutrition as appropriate  - Assess for signs & symptoms of volume excess or deficit  Outcome: Progressing  Goal: Glucose maintained within target range  Description  INTERVENTIONS:  - Monitor Blood Glucose as ordered  - Assess for signs and symptoms of hyperglycemia and hypoglycemia  - Administer ordered medications to maintain glucose within target range  - Assess nutritional intake and initiate nutrition service referral as needed  Outcome: Progressing     Problem: SKIN/TISSUE INTEGRITY - ADULT  Goal: Skin integrity remains intact  Description  INTERVENTIONS  - Identify patients at risk for skin breakdown  - Assess and monitor skin integrity  - Assess and monitor nutrition and hydration status  - Monitor labs (i e  albumin)  - Assess for incontinence   - Turn and reposition patient  - Assist with mobility/ambulation  - Relieve pressure over bony prominences  - Avoid friction and shearing  - Provide appropriate hygiene as needed including keeping skin clean and dry  - Evaluate need for skin moisturizer/barrier cream  - Collaborate with interdisciplinary team (i e  Nutrition, Rehabilitation, etc )   - Patient/family teaching  Outcome: Progressing  Goal: Incision(s), wounds(s) or drain site(s) healing without S/S of infection  Description  INTERVENTIONS  - Assess and document risk factors for skin impairment   - Assess and document dressing, incision, wound bed, drain sites and surrounding tissue  - Consider nutrition services referral as needed  - Oral mucous membranes remain intact  - Provide patient/ family education  Outcome: Progressing     Problem: HEMATOLOGIC - ADULT  Goal: Maintains hematologic stability  Description  INTERVENTIONS  - Assess for signs and symptoms of bleeding or hemorrhage  - Monitor labs  - Administer supportive blood products/factors as ordered and appropriate  Outcome: Progressing     Problem: PAIN - ADULT  Goal: Verbalizes/displays adequate comfort level or baseline comfort level  Description  Interventions:  - Encourage patient to monitor pain and request assistance  - Assess pain using appropriate pain scale  - Administer analgesics based on type and severity of pain and evaluate response  - Implement non-pharmacological measures as appropriate and evaluate response  - Consider cultural and social influences on pain and pain management  - Notify physician/advanced practitioner if interventions unsuccessful or patient reports new pain  Outcome: Progressing     Problem: INFECTION - ADULT  Goal: Absence or prevention of progression during hospitalization  Description  INTERVENTIONS:  - Assess and monitor for signs and symptoms of infection  - Monitor lab/diagnostic results  - Monitor all insertion sites, i e  indwelling lines, tubes, and drains  - Monitor endotracheal if appropriate and nasal secretions for changes in amount and color  - [place] appropriate cooling/warming therapies per order  - Administer medications as ordered  - Instruct and encourage patient and family to use good hand hygiene technique  - Identify and instruct in appropriate isolation precautions for identified infection/condition  Outcome: Progressing  Goal: Absence of fever/infection during neutropenic period  Description  INTERVENTIONS:  - Monitor WBC    Outcome: Progressing     Problem: SAFETY ADULT  Goal: Maintain or return to baseline ADL function  Description  INTERVENTIONS:  -  Assess patient's ability to carry out ADLs; assess patient's baseline for ADL function and identify physical deficits which impact ability to perform ADLs (bathing, care of mouth/teeth, toileting, grooming, dressing, etc )  - Assess/evaluate cause of self-care deficits   - Assess range of motion  - Assess patient's mobility; develop plan if impaired  - Assess patient's need for assistive devices and provide as appropriate  - Encourage maximum independence but intervene and supervise when necessary  - Involve family in performance of ADLs  - Assess for home care needs following discharge   - Consider OT consult to assist with ADL evaluation and planning for discharge  - Provide patient education as appropriate  Outcome: Progressing  Goal: Maintain or return mobility status to optimal level  Description  INTERVENTIONS:  - Assess patient's baseline mobility status (ambulation, transfers, stairs, etc )    - Identify cognitive and physical deficits and behaviors that affect mobility  - Identify mobility aids required to assist with transfers and/or ambulation (gait belt, sit-to-stand, lift, walker, cane, etc )  - [place] fall precautions as indicated by assessment  - Record patient progress and toleration of activity level on Mobility SBAR; progress patient to next Phase/Stage  - Instruct patient to call for assistance with activity based on assessment  - Consider rehabilitation consult to assist with strengthening/weightbearing, etc   Outcome: Progressing     Problem: DISCHARGE PLANNING  Goal: Discharge to home or other facility with appropriate resources  Description  INTERVENTIONS:  - Identify barriers to discharge w/patient and caregiver  - Arrange for needed discharge resources and transportation as appropriate  - Identify discharge learning needs (meds, wound care, etc )  - Arrange for interpretive services to assist at discharge as needed  - Refer to Case Management Department for coordinating discharge planning if the patient needs post-hospital services based on physician/advanced practitioner order or complex needs related to functional status, cognitive ability, or social support system  Outcome: Progressing     Problem: Knowledge Deficit  Goal: Patient/family/caregiver demonstrates understanding of disease process, treatment plan, medications, and discharge instructions  Description  Complete learning assessment and assess knowledge base    Interventions:  - Provide teaching at level of understanding  - Provide teaching via preferred learning methods  Outcome: Progressing

## 2019-08-14 NOTE — ANESTHESIA POSTPROCEDURE EVALUATION
Post-Op Assessment Note    CV Status:  Stable  Pain Score: 5    Pain management: adequate  Multimodal analgesia used between 6 hours prior to anesthesia start to PACU discharge    Mental Status:  Alert and awake   Hydration Status:  Euvolemic   PONV Controlled:  Controlled   Airway Patency:  Patent  Airway: intubated   Post Op Vitals Reviewed: Yes      Staff: Anesthesiologist, CRNA           BP      Temp      Pulse     Resp      SpO2

## 2019-08-14 NOTE — DISCHARGE INSTRUCTIONS
Salpingectomy   WHAT YOU NEED TO KNOW:   A salpingectomy is surgery to remove one or both of your fallopian tubes  The fallopian tubes carry eggs from the ovaries to the uterus  They are part of a woman's reproductive system  A salpingectomy may be done to treat an ectopic pregnancy, cancer, endometriosis, or an infection  It may also be done to prevent pregnancy or some types of cancer  DISCHARGE INSTRUCTIONS:   Call 911 for any of the following:   · You feel lightheaded, short of breath, and have chest pain  · You cough up blood  · You have trouble breathing  Seek care immediately if:   · Your arm or leg feels warm, tender, and painful  It may look swollen and red  · Blood soaks through your bandage  · Your stitches come apart  · You soak through 1 sanitary pad in 1 hour  · You have trouble urinating or cannot urinate at all  Contact your healthcare provider if:   · You have a fever or chills  · Your wound is red, swollen, or draining pus  · You have pus or a foul-smelling odor coming from your vagina  · Your pain does not get better after you take your medicine  · You have nausea or are vomiting  · Your skin is itchy, swollen, or you have a rash  · You have questions or concerns about your condition or care  Medicines: You may need any of the following:  · Prescription pain medicine  may be given  Ask your healthcare provider how to take this medicine safely  · NSAIDs , such as ibuprofen, help decrease swelling, pain, and fever  NSAIDs can cause stomach bleeding or kidney problems in certain people  If you take blood thinner medicine, always ask your healthcare provider if NSAIDs are safe for you  Always read the medicine label and follow directions  · Take your medicine as directed  Contact your healthcare provider if you think your medicine is not helping or if you have side effects  Tell him or her if you are allergic to any medicine   Keep a list of the medicines, vitamins, and herbs you take  Include the amounts, and when and why you take them  Bring the list or the pill bottles to follow-up visits  Carry your medicine list with you in case of an emergency  Care for your wound as directed:  Ask your healthcare provider when your wound can get wet  Do not take a bath until your healthcare provider says it is okay  Take a shower only  Carefully wash around the wound with soap and water  Let the soap and water gently run over your incision  Do not  scrub your incision  Dry the area and put on new, clean bandages as directed  Change your bandages when they get wet or dirty  If you have strips of medical tape, let them fall off on their own  Activity:  Ask your healthcare provider when you can return to your normal activities  Do not douche, use tampons, or have sex until your healthcare provider says it is okay  These activities may cause infection  Do not exercise or lift anything heavy until your healthcare provider says it is okay  This may put too much stress on your incision  Follow up with your healthcare provider as directed:  Write down your questions so you remember to ask them during your visits  © 2017 [facility] Information is for End User's use only and may not be sold, redistributed or otherwise used for commercial purposes  All illustrations and images included in CareNotes® are the copyrighted property of [employer] , [employer]  or Piotr Johnston  The above information is an  only  It is not intended as medical advice for individual conditions or treatments  Talk to your doctor, nurse or pharmacist before following any medical regimen to see if it is safe and effective for you

## 2019-08-14 NOTE — PLAN OF CARE
Problem: Potential for Falls  Goal: Patient will remain free of falls  Description  INTERVENTIONS:  - Assess patient frequently for physical needs  -  Identify cognitive and physical deficits and behaviors that affect risk of falls    -  [place] fall precautions as indicated by assessment   - Educate patient/family on patient safety including physical limitations  - Instruct patient to call for assistance with activity based on assessment  - Modify environment to reduce risk of injury  - Consider OT/PT consult to assist with strengthening/mobility  Outcome: Progressing     Problem: CARDIOVASCULAR - ADULT  Goal: Maintains optimal cardiac output and hemodynamic stability  Description  INTERVENTIONS:  - Monitor I/O, vital signs and rhythm  - Monitor for S/S and trends of decreased cardiac output  - Administer and titrate ordered vasoactive medications to optimize hemodynamic stability  - Assess quality of pulses, skin color and temperature  - Assess for signs of decreased coronary artery perfusion  - Instruct patient to report change in severity of symptoms  Outcome: Progressing     Problem: GASTROINTESTINAL - ADULT  Goal: Minimal or absence of nausea and/or vomiting  Description  INTERVENTIONS:  - Administer IV fluids if ordered to ensure adequate hydration  - Maintain NPO status until nausea and vomiting are resolved  - Nasogastric tube if ordered  - Administer ordered antiemetic medications as needed  - Provide nonpharmacologic comfort measures as appropriate  - Advance diet as tolerated, if ordered  - Consider nutrition services referral to assist patient with adequate nutrition and appropriate food choices  Outcome: Progressing  Goal: Maintains adequate nutritional intake  Description  INTERVENTIONS:  - Monitor percentage of each meal consumed  - Identify factors contributing to decreased intake, treat as appropriate  - Assist with meals as needed  - Monitor I&O, weight, and lab values if indicated  - Obtain nutrition services referral as needed  Outcome: Progressing     Problem: METABOLIC, FLUID AND ELECTROLYTES - ADULT  Goal: Electrolytes maintained within normal limits  Description  INTERVENTIONS:  - Monitor labs and assess patient for signs and symptoms of electrolyte imbalances  - Administer electrolyte replacement as ordered  - Monitor response to electrolyte replacements, including repeat lab results as appropriate  - Instruct patient on fluid and nutrition as appropriate  Outcome: Progressing  Goal: Fluid balance maintained  Description  INTERVENTIONS:  - Monitor labs   - Monitor I/O and WT  - Instruct patient on fluid and nutrition as appropriate  - Assess for signs & symptoms of volume excess or deficit  Outcome: Progressing  Goal: Glucose maintained within target range  Description  INTERVENTIONS:  - Monitor Blood Glucose as ordered  - Assess for signs and symptoms of hyperglycemia and hypoglycemia  - Administer ordered medications to maintain glucose within target range  - Assess nutritional intake and initiate nutrition service referral as needed  Outcome: Progressing     Problem: SKIN/TISSUE INTEGRITY - ADULT  Goal: Skin integrity remains intact  Description  INTERVENTIONS  - Identify patients at risk for skin breakdown  - Assess and monitor skin integrity  - Assess and monitor nutrition and hydration status  - Monitor labs (i e  albumin)  - Assess for incontinence   - Turn and reposition patient  - Assist with mobility/ambulation  - Relieve pressure over bony prominences  - Avoid friction and shearing  - Provide appropriate hygiene as needed including keeping skin clean and dry  - Evaluate need for skin moisturizer/barrier cream  - Collaborate with interdisciplinary team (i e  Nutrition, Rehabilitation, etc )   - Patient/family teaching  Outcome: Progressing  Goal: Incision(s), wounds(s) or drain site(s) healing without S/S of infection  Description  INTERVENTIONS  - Assess and document risk factors for skin impairment   - Assess and document dressing, incision, wound bed, drain sites and surrounding tissue  - Consider nutrition services referral as needed  - Oral mucous membranes remain intact  - Provide patient/ family education  Outcome: Progressing     Problem: HEMATOLOGIC - ADULT  Goal: Maintains hematologic stability  Description  INTERVENTIONS  - Assess for signs and symptoms of bleeding or hemorrhage  - Monitor labs  - Administer supportive blood products/factors as ordered and appropriate  Outcome: Progressing     Problem: PAIN - ADULT  Goal: Verbalizes/displays adequate comfort level or baseline comfort level  Description  Interventions:  - Encourage patient to monitor pain and request assistance  - Assess pain using appropriate pain scale  - Administer analgesics based on type and severity of pain and evaluate response  - Implement non-pharmacological measures as appropriate and evaluate response  - Consider cultural and social influences on pain and pain management  - Notify physician/advanced practitioner if interventions unsuccessful or patient reports new pain  Outcome: Progressing     Problem: INFECTION - ADULT  Goal: Absence or prevention of progression during hospitalization  Description  INTERVENTIONS:  - Assess and monitor for signs and symptoms of infection  - Monitor lab/diagnostic results  - Monitor all insertion sites, i e  indwelling lines, tubes, and drains  - Monitor endotracheal if appropriate and nasal secretions for changes in amount and color  - [place] appropriate cooling/warming therapies per order  - Administer medications as ordered  - Instruct and encourage patient and family to use good hand hygiene technique  - Identify and instruct in appropriate isolation precautions for identified infection/condition  Outcome: Progressing  Goal: Absence of fever/infection during neutropenic period  Description  INTERVENTIONS:  - Monitor WBC    Outcome: Progressing     Problem: SAFETY ADULT  Goal: Maintain or return to baseline ADL function  Description  INTERVENTIONS:  -  Assess patient's ability to carry out ADLs; assess patient's baseline for ADL function and identify physical deficits which impact ability to perform ADLs (bathing, care of mouth/teeth, toileting, grooming, dressing, etc )  - Assess/evaluate cause of self-care deficits   - Assess range of motion  - Assess patient's mobility; develop plan if impaired  - Assess patient's need for assistive devices and provide as appropriate  - Encourage maximum independence but intervene and supervise when necessary  - Involve family in performance of ADLs  - Assess for home care needs following discharge   - Consider OT consult to assist with ADL evaluation and planning for discharge  - Provide patient education as appropriate  Outcome: Progressing  Goal: Maintain or return mobility status to optimal level  Description  INTERVENTIONS:  - Assess patient's baseline mobility status (ambulation, transfers, stairs, etc )    - Identify cognitive and physical deficits and behaviors that affect mobility  - Identify mobility aids required to assist with transfers and/or ambulation (gait belt, sit-to-stand, lift, walker, cane, etc )  - [place] fall precautions as indicated by assessment  - Record patient progress and toleration of activity level on Mobility SBAR; progress patient to next Phase/Stage  - Instruct patient to call for assistance with activity based on assessment  - Consider rehabilitation consult to assist with strengthening/weightbearing, etc   Outcome: Progressing     Problem: DISCHARGE PLANNING  Goal: Discharge to home or other facility with appropriate resources  Description  INTERVENTIONS:  - Identify barriers to discharge w/patient and caregiver  - Arrange for needed discharge resources and transportation as appropriate  - Identify discharge learning needs (meds, wound care, etc )  - Arrange for interpretive services to assist at discharge as needed  - Refer to Case Management Department for coordinating discharge planning if the patient needs post-hospital services based on physician/advanced practitioner order or complex needs related to functional status, cognitive ability, or social support system  Outcome: Progressing     Problem: Knowledge Deficit  Goal: Patient/family/caregiver demonstrates understanding of disease process, treatment plan, medications, and discharge instructions  Description  Complete learning assessment and assess knowledge base    Interventions:  - Provide teaching at level of understanding  - Provide teaching via preferred learning methods  Outcome: Progressing

## 2019-08-15 VITALS
SYSTOLIC BLOOD PRESSURE: 135 MMHG | BODY MASS INDEX: 43.23 KG/M2 | WEIGHT: 269 LBS | OXYGEN SATURATION: 97 % | DIASTOLIC BLOOD PRESSURE: 92 MMHG | HEART RATE: 84 BPM | RESPIRATION RATE: 16 BRPM | HEIGHT: 66 IN | TEMPERATURE: 97.5 F

## 2019-08-15 LAB
ERYTHROCYTE [DISTWIDTH] IN BLOOD BY AUTOMATED COUNT: 13.4 % (ref 11.6–15.1)
ERYTHROCYTE [DISTWIDTH] IN BLOOD BY AUTOMATED COUNT: 13.5 % (ref 11.6–15.1)
ERYTHROCYTE [DISTWIDTH] IN BLOOD BY AUTOMATED COUNT: 13.6 % (ref 11.6–15.1)
GLUCOSE SERPL-MCNC: 177 MG/DL (ref 65–140)
GLUCOSE SERPL-MCNC: 214 MG/DL (ref 65–140)
GLUCOSE SERPL-MCNC: 231 MG/DL (ref 65–140)
HCT VFR BLD AUTO: 38.5 % (ref 34.8–46.1)
HCT VFR BLD AUTO: 38.8 % (ref 34.8–46.1)
HCT VFR BLD AUTO: 39.9 % (ref 34.8–46.1)
HGB BLD-MCNC: 12.3 G/DL (ref 11.5–15.4)
HGB BLD-MCNC: 12.4 G/DL (ref 11.5–15.4)
HGB BLD-MCNC: 12.7 G/DL (ref 11.5–15.4)
MCH RBC QN AUTO: 27.1 PG (ref 26.8–34.3)
MCH RBC QN AUTO: 27.2 PG (ref 26.8–34.3)
MCH RBC QN AUTO: 27.3 PG (ref 26.8–34.3)
MCHC RBC AUTO-ENTMCNC: 31.8 G/DL (ref 31.4–37.4)
MCHC RBC AUTO-ENTMCNC: 31.9 G/DL (ref 31.4–37.4)
MCHC RBC AUTO-ENTMCNC: 32 G/DL (ref 31.4–37.4)
MCV RBC AUTO: 85 FL (ref 82–98)
PLATELET # BLD AUTO: 281 THOUSANDS/UL (ref 149–390)
PLATELET # BLD AUTO: 312 THOUSANDS/UL (ref 149–390)
PLATELET # BLD AUTO: 340 THOUSANDS/UL (ref 149–390)
PMV BLD AUTO: 10.4 FL (ref 8.9–12.7)
PMV BLD AUTO: 9.9 FL (ref 8.9–12.7)
PMV BLD AUTO: 9.9 FL (ref 8.9–12.7)
RBC # BLD AUTO: 4.52 MILLION/UL (ref 3.81–5.12)
RBC # BLD AUTO: 4.55 MILLION/UL (ref 3.81–5.12)
RBC # BLD AUTO: 4.69 MILLION/UL (ref 3.81–5.12)
WBC # BLD AUTO: 15.47 THOUSAND/UL (ref 4.31–10.16)
WBC # BLD AUTO: 15.73 THOUSAND/UL (ref 4.31–10.16)
WBC # BLD AUTO: 17.03 THOUSAND/UL (ref 4.31–10.16)

## 2019-08-15 PROCEDURE — 85027 COMPLETE CBC AUTOMATED: CPT | Performed by: OBSTETRICS & GYNECOLOGY

## 2019-08-15 PROCEDURE — 99024 POSTOP FOLLOW-UP VISIT: CPT | Performed by: OBSTETRICS & GYNECOLOGY

## 2019-08-15 PROCEDURE — 82948 REAGENT STRIP/BLOOD GLUCOSE: CPT

## 2019-08-15 RX ORDER — INSULIN GLARGINE 100 [IU]/ML
23 INJECTION, SOLUTION SUBCUTANEOUS
Refills: 0 | Status: SHIPPED | OUTPATIENT
Start: 2019-08-15

## 2019-08-15 RX ORDER — METFORMIN HYDROCHLORIDE 500 MG/1
500 TABLET, EXTENDED RELEASE ORAL 2 TIMES DAILY
Refills: 0 | Status: SHIPPED | OUTPATIENT
Start: 2019-08-15

## 2019-08-15 RX ORDER — BUTALBITAL, ACETAMINOPHEN AND CAFFEINE 50; 325; 40 MG/1; MG/1; MG/1
1 TABLET ORAL ONCE
Status: DISCONTINUED | OUTPATIENT
Start: 2019-08-15 | End: 2019-08-15 | Stop reason: HOSPADM

## 2019-08-15 RX ADMIN — ACETAMINOPHEN 650 MG: 325 TABLET ORAL at 14:43

## 2019-08-15 RX ADMIN — OXYCODONE HYDROCHLORIDE 5 MG: 5 TABLET ORAL at 04:12

## 2019-08-15 RX ADMIN — ACETAMINOPHEN 650 MG: 325 TABLET ORAL at 02:27

## 2019-08-15 RX ADMIN — OXYCODONE HYDROCHLORIDE 10 MG: 10 TABLET ORAL at 15:25

## 2019-08-15 RX ADMIN — OXYCODONE HYDROCHLORIDE 5 MG: 5 TABLET ORAL at 13:07

## 2019-08-15 RX ADMIN — IBUPROFEN 600 MG: 600 TABLET ORAL at 08:50

## 2019-08-15 RX ADMIN — METFORMIN HYDROCHLORIDE 500 MG: 500 TABLET, EXTENDED RELEASE ORAL at 08:49

## 2019-08-15 NOTE — NURSING NOTE
Pt developed a painful 8/10 tension headache around 0000  She received Tylenol but suspected the HA was from the 5mg oxy  Paged OB for Fioricet when pain was not relieved  Order came after 0400 but pt requested 2nd dose of Tylenol  Placed fioricet on hold if needed  Incisions were sore but not painful  Most of abdominal pain was on the right U/LQ

## 2019-08-15 NOTE — PLAN OF CARE
Problem: Potential for Falls  Goal: Patient will remain free of falls  Description  INTERVENTIONS:  - Assess patient frequently for physical needs  -  Identify cognitive and physical deficits and behaviors that affect risk of falls    -  [place] fall precautions as indicated by assessment   - Educate patient/family on patient safety including physical limitations  - Instruct patient to call for assistance with activity based on assessment  - Modify environment to reduce risk of injury  - Consider OT/PT consult to assist with strengthening/mobility  Outcome: Progressing     Problem: CARDIOVASCULAR - ADULT  Goal: Maintains optimal cardiac output and hemodynamic stability  Description  INTERVENTIONS:  - Monitor I/O, vital signs and rhythm  - Monitor for S/S and trends of decreased cardiac output  - Administer and titrate ordered vasoactive medications to optimize hemodynamic stability  - Assess quality of pulses, skin color and temperature  - Assess for signs of decreased coronary artery perfusion  - Instruct patient to report change in severity of symptoms  Outcome: Progressing     Problem: GASTROINTESTINAL - ADULT  Goal: Minimal or absence of nausea and/or vomiting  Description  INTERVENTIONS:  - Administer IV fluids if ordered to ensure adequate hydration  - Maintain NPO status until nausea and vomiting are resolved  - Nasogastric tube if ordered  - Administer ordered antiemetic medications as needed  - Provide nonpharmacologic comfort measures as appropriate  - Advance diet as tolerated, if ordered  - Consider nutrition services referral to assist patient with adequate nutrition and appropriate food choices  Outcome: Progressing  Goal: Maintains adequate nutritional intake  Description  INTERVENTIONS:  - Monitor percentage of each meal consumed  - Identify factors contributing to decreased intake, treat as appropriate  - Assist with meals as needed  - Monitor I&O, weight, and lab values if indicated  - Obtain nutrition services referral as needed  Outcome: Progressing     Problem: METABOLIC, FLUID AND ELECTROLYTES - ADULT  Goal: Electrolytes maintained within normal limits  Description  INTERVENTIONS:  - Monitor labs and assess patient for signs and symptoms of electrolyte imbalances  - Administer electrolyte replacement as ordered  - Monitor response to electrolyte replacements, including repeat lab results as appropriate  - Instruct patient on fluid and nutrition as appropriate  Outcome: Progressing  Goal: Fluid balance maintained  Description  INTERVENTIONS:  - Monitor labs   - Monitor I/O and WT  - Instruct patient on fluid and nutrition as appropriate  - Assess for signs & symptoms of volume excess or deficit  Outcome: Progressing  Goal: Glucose maintained within target range  Description  INTERVENTIONS:  - Monitor Blood Glucose as ordered  - Assess for signs and symptoms of hyperglycemia and hypoglycemia  - Administer ordered medications to maintain glucose within target range  - Assess nutritional intake and initiate nutrition service referral as needed  Outcome: Progressing     Problem: SKIN/TISSUE INTEGRITY - ADULT  Goal: Skin integrity remains intact  Description  INTERVENTIONS  - Identify patients at risk for skin breakdown  - Assess and monitor skin integrity  - Assess and monitor nutrition and hydration status  - Monitor labs (i e  albumin)  - Assess for incontinence   - Turn and reposition patient  - Assist with mobility/ambulation  - Relieve pressure over bony prominences  - Avoid friction and shearing  - Provide appropriate hygiene as needed including keeping skin clean and dry  - Evaluate need for skin moisturizer/barrier cream  - Collaborate with interdisciplinary team (i e  Nutrition, Rehabilitation, etc )   - Patient/family teaching  Outcome: Progressing  Goal: Incision(s), wounds(s) or drain site(s) healing without S/S of infection  Description  INTERVENTIONS  - Assess and document risk factors for skin impairment   - Assess and document dressing, incision, wound bed, drain sites and surrounding tissue  - Consider nutrition services referral as needed  - Oral mucous membranes remain intact  - Provide patient/ family education  Outcome: Progressing     Problem: HEMATOLOGIC - ADULT  Goal: Maintains hematologic stability  Description  INTERVENTIONS  - Assess for signs and symptoms of bleeding or hemorrhage  - Monitor labs  - Administer supportive blood products/factors as ordered and appropriate  Outcome: Progressing     Problem: PAIN - ADULT  Goal: Verbalizes/displays adequate comfort level or baseline comfort level  Description  Interventions:  - Encourage patient to monitor pain and request assistance  - Assess pain using appropriate pain scale  - Administer analgesics based on type and severity of pain and evaluate response  - Implement non-pharmacological measures as appropriate and evaluate response  - Consider cultural and social influences on pain and pain management  - Notify physician/advanced practitioner if interventions unsuccessful or patient reports new pain  Outcome: Progressing     Problem: INFECTION - ADULT  Goal: Absence or prevention of progression during hospitalization  Description  INTERVENTIONS:  - Assess and monitor for signs and symptoms of infection  - Monitor lab/diagnostic results  - Monitor all insertion sites, i e  indwelling lines, tubes, and drains  - Monitor endotracheal if appropriate and nasal secretions for changes in amount and color  - [place] appropriate cooling/warming therapies per order  - Administer medications as ordered  - Instruct and encourage patient and family to use good hand hygiene technique  - Identify and instruct in appropriate isolation precautions for identified infection/condition  Outcome: Progressing  Goal: Absence of fever/infection during neutropenic period  Description  INTERVENTIONS:  - Monitor WBC    Outcome: Progressing     Problem: SAFETY ADULT  Goal: Maintain or return to baseline ADL function  Description  INTERVENTIONS:  -  Assess patient's ability to carry out ADLs; assess patient's baseline for ADL function and identify physical deficits which impact ability to perform ADLs (bathing, care of mouth/teeth, toileting, grooming, dressing, etc )  - Assess/evaluate cause of self-care deficits   - Assess range of motion  - Assess patient's mobility; develop plan if impaired  - Assess patient's need for assistive devices and provide as appropriate  - Encourage maximum independence but intervene and supervise when necessary  - Involve family in performance of ADLs  - Assess for home care needs following discharge   - Consider OT consult to assist with ADL evaluation and planning for discharge  - Provide patient education as appropriate  Outcome: Progressing  Goal: Maintain or return mobility status to optimal level  Description  INTERVENTIONS:  - Assess patient's baseline mobility status (ambulation, transfers, stairs, etc )    - Identify cognitive and physical deficits and behaviors that affect mobility  - Identify mobility aids required to assist with transfers and/or ambulation (gait belt, sit-to-stand, lift, walker, cane, etc )  - [place] fall precautions as indicated by assessment  - Record patient progress and toleration of activity level on Mobility SBAR; progress patient to next Phase/Stage  - Instruct patient to call for assistance with activity based on assessment  - Consider rehabilitation consult to assist with strengthening/weightbearing, etc   Outcome: Progressing     Problem: DISCHARGE PLANNING  Goal: Discharge to home or other facility with appropriate resources  Description  INTERVENTIONS:  - Identify barriers to discharge w/patient and caregiver  - Arrange for needed discharge resources and transportation as appropriate  - Identify discharge learning needs (meds, wound care, etc )  - Arrange for interpretive services to assist at discharge as needed  - Refer to Case Management Department for coordinating discharge planning if the patient needs post-hospital services based on physician/advanced practitioner order or complex needs related to functional status, cognitive ability, or social support system  Outcome: Progressing     Problem: Knowledge Deficit  Goal: Patient/family/caregiver demonstrates understanding of disease process, treatment plan, medications, and discharge instructions  Description  Complete learning assessment and assess knowledge base    Interventions:  - Provide teaching at level of understanding  - Provide teaching via preferred learning methods  Outcome: Progressing

## 2019-08-15 NOTE — NURSING NOTE
Patient given discharge instructions  Prescription sent to pharmacy  IV pulled  Patient denied any questions  Pt waked to vehicle with PCA and family

## 2019-08-26 ENCOUNTER — OFFICE VISIT (OUTPATIENT)
Dept: OBGYN CLINIC | Facility: CLINIC | Age: 29
End: 2019-08-26

## 2019-08-26 VITALS
DIASTOLIC BLOOD PRESSURE: 80 MMHG | HEIGHT: 66 IN | WEIGHT: 276.4 LBS | SYSTOLIC BLOOD PRESSURE: 120 MMHG | BODY MASS INDEX: 44.42 KG/M2

## 2019-08-26 DIAGNOSIS — Z90.79 STATUS POST BILATERAL SALPINGECTOMY: Primary | ICD-10-CM

## 2019-08-26 PROCEDURE — 99024 POSTOP FOLLOW-UP VISIT: CPT | Performed by: OBSTETRICS & GYNECOLOGY

## 2019-08-26 RX ORDER — ESCITALOPRAM OXALATE 5 MG/1
TABLET ORAL
Refills: 0 | COMMUNITY
Start: 2019-08-20 | End: 2020-02-10 | Stop reason: ALTCHOICE

## 2019-08-26 NOTE — PROGRESS NOTES
Post op     Pt is doing great   Little pain only at umbilical incision     wounds are all clean and dry     Discussed the procedure and the hematoma again that we discussed at time of surgery she feels well     Returned to work already   Social Strategy 1 for all activity as fit    Did go over elevated HG A 1c and her weight she will work on it again to get down     Pt to return to [facility] her doctor

## 2019-09-05 ENCOUNTER — OFFICE VISIT (OUTPATIENT)
Dept: OBGYN CLINIC | Facility: CLINIC | Age: 29
End: 2019-09-05
Payer: COMMERCIAL

## 2019-09-05 VITALS — DIASTOLIC BLOOD PRESSURE: 80 MMHG | WEIGHT: 278.2 LBS | BODY MASS INDEX: 45.59 KG/M2 | SYSTOLIC BLOOD PRESSURE: 138 MMHG

## 2019-09-05 DIAGNOSIS — N76.0 BV (BACTERIAL VAGINOSIS): ICD-10-CM

## 2019-09-05 DIAGNOSIS — Z20.2 POSSIBLE EXPOSURE TO STD: Primary | ICD-10-CM

## 2019-09-05 DIAGNOSIS — B96.89 BV (BACTERIAL VAGINOSIS): ICD-10-CM

## 2019-09-05 DIAGNOSIS — N89.8 VAGINAL ODOR: ICD-10-CM

## 2019-09-05 PROCEDURE — 87210 SMEAR WET MOUNT SALINE/INK: CPT | Performed by: OBSTETRICS & GYNECOLOGY

## 2019-09-05 PROCEDURE — 99213 OFFICE O/P EST LOW 20 MIN: CPT | Performed by: OBSTETRICS & GYNECOLOGY

## 2019-09-05 PROCEDURE — 87491 CHLMYD TRACH DNA AMP PROBE: CPT | Performed by: OBSTETRICS & GYNECOLOGY

## 2019-09-05 PROCEDURE — 87591 N.GONORRHOEAE DNA AMP PROB: CPT | Performed by: OBSTETRICS & GYNECOLOGY

## 2019-09-05 RX ORDER — METRONIDAZOLE 7.5 MG/G
1 GEL VAGINAL
Qty: 70 G | Refills: 0 | Status: SHIPPED | OUTPATIENT
Start: 2019-09-05 | End: 2019-09-10

## 2019-09-05 NOTE — LETTER
September 5, 2019     Patient: Marshall Chen   YOB: 1990   Date of Visit: 9/5/2019       To Whom it May Concern:    Marshall Chen is under my professional care  She was seen in my office on 9/5/2019  If you have any questions or concerns, please don't hesitate to call           Sincerely,          Everardo Mccarthy, DO

## 2019-09-05 NOTE — PROGRESS NOTES
Assessment/Plan:     Vaginal odor/bacterial vaginosis - prescription for Metrogel sent, reviewed instructions and avoidance of alcohol  There are no diagnoses linked to this encounter  Subjective:     Patient ID: Favian Black is a 34 y o  female  Patient here for evaluation of vaginal odor  She has a history of bacteria vaginosis  She has minimal vaginal discharge  She notices the odor more during her menstrual cycle  She is status post bilateral salpingectomy last month  She denies itching            Review of Systems   Genitourinary: Positive for vaginal discharge  Negative for vaginal bleeding  All other systems reviewed and are negative  Objective:     Physical Exam   Genitourinary: Vagina normal and uterus normal  No labial fusion  There is no rash, tenderness, lesion or injury on the right labia  There is no rash, tenderness, lesion or injury on the left labia  Cervix exhibits no motion tenderness, no discharge and no friability  Right adnexum displays no mass, no tenderness and no fullness  Left adnexum displays no mass, no tenderness and no fullness     Genitourinary Comments: Minimal vaginal discharge noted

## 2019-09-06 LAB
C TRACH DNA SPEC QL NAA+PROBE: NEGATIVE
N GONORRHOEA DNA SPEC QL NAA+PROBE: NEGATIVE

## 2019-11-11 ENCOUNTER — CLINICAL SUPPORT (OUTPATIENT)
Dept: BARIATRICS | Facility: CLINIC | Age: 29
End: 2019-11-11

## 2019-11-11 VITALS
HEIGHT: 65 IN | RESPIRATION RATE: 16 BRPM | WEIGHT: 276.5 LBS | DIASTOLIC BLOOD PRESSURE: 70 MMHG | HEART RATE: 90 BPM | SYSTOLIC BLOOD PRESSURE: 130 MMHG | TEMPERATURE: 98.4 F | BODY MASS INDEX: 46.07 KG/M2

## 2019-11-11 DIAGNOSIS — E66.01 MORBID OBESITY (HCC): Primary | ICD-10-CM

## 2019-11-11 PROCEDURE — RECHECK: Performed by: DIETITIAN, REGISTERED

## 2019-11-11 NOTE — PROGRESS NOTES
Bariatric Nutrition Assessment Note    Type of surgery    Preop 6 month program  Surgery Date: TBD- Tentative June 2020  Surgeon: Dr Mindy Jung  34 y o   female     Wt with BMI of 25: 159 3lbs  Pre-Op Excess Wt: 117 5lbs  /70 (BP Location: Left arm, Patient Position: Sitting, Cuff Size: Adult)   Pulse 90   Temp 98 4 °F (36 9 °C) (Tympanic)   Resp 16   Ht 5' 5" (1 651 m)   Wt 125 kg (276 lb 8 oz)   BMI 46 01 kg/m²     [facility] Equation:  2378 kcal/day for weight maintenance  1378 kcal/day=2lb/wk wt loss      Weight History   Onset of Obesity: Childhood  Family history of obesity: Yes  Wt Loss Attempts: Exercise  High Protein/Low CHO diets (Atkins, Union, etc )  Meal Replacements (Medifast, Slim Fast, etc )  Nutrition Counseling with RD  Self Created Diets (Portion Control, Healthy Food Choices, etc )  Patient has tried the above for 6 months or more with insufficient weight loss or weight regain, which is why patient has requested to be evaluated for weight loss surgery today  Maximum Wt Lost: 60lbs with diet and exercise      Review of History and Medications   Past Medical History:   Diagnosis Date    ADD (attention deficit disorder)     Anxiety     Blood type A+     pt unsure of blood type    Depression     Diabetes mellitus ([facility] [address] )     per Allscripts: Type 2    Dietary calcium deficiency     Last Assessed:8/18/15/pt denies    Dry eyes, bilateral     Dysmenorrhea     adequate response to IBuprofen; Last Assessed:11/23/16    GERD (gastroesophageal reflux disease)     occas    Hematuria     Last Assessed:12/20/16/not currently pt reports    History of kidney stones     Hypercholesterolemia     not currently pt reports    IBS (irritable bowel syndrome)     resolved with stopping metforming    Irregular heart beat     pt reports one time had irregular heart beat noted on EKG    Menorrhagia     Migraine without aura     Morbid obesity due to excess calories (HCC)     Last Assessed:1/21/16    Motion sickness     PCOS (polycystic ovarian syndrome)     Shortness of breath     exertional    Tooth missing     "gums are receding to have surgery in the future"     Past Surgical History:   Procedure Laterality Date    DILATION AND EVACUATION  12/15/2017    ETOP    INSERTION OF INTRAUTERINE DEVICE (IUD)  10/18/2018    KYLEBENY    NE LAP,RMV  ADNEXAL STRUCTURE Bilateral 8/14/2019    Procedure: SALPINGECTOMY, LAPAROSCOPIC;  Surgeon: Zenda Spatz, MD;  Location: [facility];  Service: Gynecology    REMOVAL OF INTRAUTERINE DEVICE (IUD)      TONSILLECTOMY      WISDOM TOOTH EXTRACTION       Social History     Socioeconomic History    Marital status: Single     Spouse name: None    Number of children: 0    Years of education: HS, cosmetology    Highest education level: None   Occupational History    Occupation: beauty/hair salon   Social Needs    Financial resource strain: None    Food insecurity:     Worry: None     Inability: None    Transportation needs:     Medical: None     Non-medical: None   Tobacco Use    Smoking status: Never Smoker    Smokeless tobacco: Never Used   Substance and Sexual Activity    Alcohol use:  Yes     Alcohol/week: 2 0 standard drinks     Types: 2 Glasses of wine per week     Frequency: Monthly or less     Comment: special occasions    Drug use: Not Currently     Types: Marijuana     Comment: occasionally    Sexual activity: None   Lifestyle    Physical activity:     Days per week: None     Minutes per session: None    Stress: None   Relationships    Social connections:     Talks on phone: None     Gets together: None     Attends Latter-day service: None     Active member of club or organization: None     Attends meetings of clubs or organizations: None     Relationship status: None    Intimate partner violence:     Fear of current or ex partner: None     Emotionally abused: None     Physically abused: None     Forced sexual activity: None   Other Topics Concern    None   Social History Narrative    Inadequate Exercise-none    Inappropriate diet and eating habits    Education: GED    Religious: [employer] non Holiness    Accepts blood products    Calcium: irregular vitamin use, 1 c almond 3x/week; occ cheese, 1 yogurt 3-4x/week           Current Outpatient Medications:     ACCU-CHEK SMARTVIEW test strip, , Disp: , Rfl:     amphetamine-dextroamphetamine (ADDERALL) 15 MG tablet, daily as needed , Disp: , Rfl: 0    APPLE CIDER VINEGAR PO, Take by mouth, Disp: , Rfl:     BYETTA 10 MCG PEN 10 MCG/0 04ML SOPN, INJECT 10 MCG UNDER THE SKIN TWO TIMES A DAY BEFORE MEALS, Disp: , Rfl: 1    CAREONE UNIFINE PENTIPS PLUS 32G X 4 MM MISC, INJECT TWO TIMES A DAY WITH BYETTA, Disp: , Rfl: 3    Cholecalciferol (VITAMIN D3 PO), Take by mouth , Disp: , Rfl:     Cyanocobalamin (B-12 PO), Take by mouth, Disp: , Rfl:     insulin glargine (LANTUS) 100 units/mL subcutaneous injection, Inject 23 Units under the skin daily at bedtime, Disp: , Rfl: 0    metFORMIN (GLUCOPHAGE-XR) 500 mg 24 hr tablet, Take 1 tablet (500 mg total) by mouth 2 (two) times a day, Disp: , Rfl: 0    Omega-3 Fatty Acids (OMEGA 3 PO), Take by mouth, Disp: , Rfl:     escitalopram (LEXAPRO) 5 mg tablet, , Disp: , Rfl: 0  Food Intake and Lifestyle Assessment   Food Intake Assessment completed via usual diet recall  Breakfast: banana, protein oatmeal, or New Direction shake    Sometimes skips breakfast   Snack: none   Lunch: usually skips when at work  Snack: none  Dinner: Pt's grandmother will cook meat, vegetable, potato OR pt will have cereal with almond milk  Snack: none  Beverage intake: water, coffee/tea and regular gatorade  Protein supplement: occasional New Direction shake for breakfast  Estimated protein intake per day: 30-60g  Estimated fluid intake per day: >64oz water  Meals eaten away from home: 1-2  Typical meal pattern: 2 meals per day and 0 snacks per day  Eating Behaviors: Consumption of high calorie/ high fat foods, Consumption of high calorie beverages, Large portion sizes and skips meals regularly  Food allergies or intolerances: Allergies   Allergen Reactions    Amoxicillin Other (See Comments)     Yeast infection     Cultural or Cheondoism considerations: none noted    Physical Assessment  Physical Activity  Types of exercise: Walking her dog 15 minutes daily  Current physical limitations: none    Psychosocial Assessment   Support systems: relative(s)  Socioeconomic factors: lives with her grandparents  Pt reports her mother had bariatric surgery and has maintained a large weight loss  Pt reports she works long hours  Nutrition Diagnosis  Diagnosis: Overweight / Obesity (NC-3 3), Excessive energy intake (NI-1 5) and Disordered eating pattern (NB-1 5)  Related to: Physical inactivity, Excessive energy intake and Inability or lack of desire to manage self-care  As Evidenced by: BMI >25, Excessive energy intake, Reports of disorded eating patterns and frequently skipping meals and going all day without eating     Nutrition Prescription: Recommend the following diet  Consistent carbohydrate    Interventions and Teaching   Discussed pre-op and post-op nutrition guidelines  Patient educated and handouts provided    Surgical changes to stomach / GI  Capacity of post-surgery stomach  Diet progression  Adequate hydration  Sugar and fat restriction to decrease "dumping syndrome"  Expected weight loss  Weight loss plateaus/ possibility of weight regain  Exercise  Suggestions for pre-op diet  Nutrition considerations after surgery  Protein supplements  Meal planning and preparation  Appropriate carbohydrate, protein, and fat intake, and food/fluid choices to maximize safe weight loss, nutrient intake, and tolerance   Dietary and lifestyle changes  Possible problems with poor eating habits  Techniques for self monitoring and keeping daily food journal  Potential for food intolerance after surgery, and ways to deal with them including: lactose intolerance, nausea, reflux, vomiting, diarrhea, food intolerance, appetite changes, gas  Vitamin / Mineral supplementation of Multivitamin with minerals and Vitamin D    Patient provided with gym coupon for [employer] Assessment: No    Education provided to: patient    Barriers to learning: No barriers identified  Readiness to change: preparation    Prior research on procedure: discussed with provider and pre-op class    Comprehension: needs reinforcement and verbalizes understanding     Expected Compliance: good  Recommendations  Pt is an appropriate candidate for surgery   Yes    Evaluation / Monitoring  Dietitian to Monitor: Eating pattern as discussed Tolerance of nutrition prescription Body weight Lab values Physical activity    Goals  Eliminate sugar sweetened beverages, Food journal, Exercise 30 minutes 5 times per week, Complete lession plans 1-6 and Eat 3 meals per day    Time Spent:   45 Minutes
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
## 2019-12-12 ENCOUNTER — OFFICE VISIT (OUTPATIENT)
Dept: BARIATRICS | Facility: CLINIC | Age: 29
End: 2019-12-12
Payer: COMMERCIAL

## 2019-12-12 VITALS
RESPIRATION RATE: 18 BRPM | WEIGHT: 278.5 LBS | BODY MASS INDEX: 46.4 KG/M2 | SYSTOLIC BLOOD PRESSURE: 136 MMHG | HEIGHT: 65 IN | HEART RATE: 74 BPM | TEMPERATURE: 97.9 F | DIASTOLIC BLOOD PRESSURE: 84 MMHG

## 2019-12-12 DIAGNOSIS — E66.01 OBESITY, CLASS III, BMI 40-49.9 (MORBID OBESITY) (HCC): Primary | ICD-10-CM

## 2019-12-12 DIAGNOSIS — Z79.4 TYPE 2 DIABETES MELLITUS WITH HYPERGLYCEMIA, WITH LONG-TERM CURRENT USE OF INSULIN (HCC): ICD-10-CM

## 2019-12-12 DIAGNOSIS — E78.5 HYPERLIPIDEMIA, UNSPECIFIED HYPERLIPIDEMIA TYPE: ICD-10-CM

## 2019-12-12 DIAGNOSIS — E28.2 PCOS (POLYCYSTIC OVARIAN SYNDROME): ICD-10-CM

## 2019-12-12 DIAGNOSIS — E11.65 TYPE 2 DIABETES MELLITUS WITH HYPERGLYCEMIA, WITH LONG-TERM CURRENT USE OF INSULIN (HCC): ICD-10-CM

## 2019-12-12 PROCEDURE — 99204 OFFICE O/P NEW MOD 45 MIN: CPT | Performed by: SURGERY

## 2019-12-12 NOTE — LETTER
December 12, 2019     Mariaa Nguyen PA-C  [phone] [id number]    Patient: Ary Ortega   YOB: 1990   Date of Visit: 12/12/2019       Dear Dr Alethea Mathew: Thank you for referring Cesario Noriega to me for evaluation  Below are my notes for this consultation  If you have questions, please do not hesitate to call me  I look forward to following your patient along with you  Sincerely,        Anuj Banks MD        CC: No Recipients  Anuj Banks MD  12/12/2019  4:07 PM  Sign at close encounter      [address] [facility] 34 y o  female MRN: [id number]  Unit/Bed#:  Encounter: [id number]      HPI:  Ary Ortega is a 34 y o  female who presents with a longstanding history of morbid obesity and inability to sustain a meaningful weight loss  Here today to discuss bariatric options  Visit type: initial visit    Symptoms: excess weight and inability to loss weight    Associated Symptoms: depressed mood and anxiety    Associated Conditions: hyperlipidemia, abdominal obesity and hypergycemia  Disease Complications: diabetes and Hyperlipidemia & PCOS  Weight Loss Interest: high  Previous Diet Trials: low calorie     Exercise Frequency:infrequency  Types of Exercise: walking        Review of Systems   Gastrointestinal:        Occasional heartburn   All other systems reviewed and are negative        Historical Information   Past Medical History:   Diagnosis Date    ADD (attention deficit disorder)     Anxiety     Blood type A+     pt unsure of blood type    Depression     Diabetes mellitus ([facility] [address] )     per Allscripts: Type 2    Dietary calcium deficiency     Last Assessed:8/18/15/pt denies    Dry eyes, bilateral     Dysmenorrhea     adequate response to IBuprofen; Last Assessed:11/23/16    GERD (gastroesophageal reflux disease)     occas    Hematuria     Last Assessed:12/20/16/not currently pt reports  History of kidney stones     Hypercholesterolemia     not currently pt reports    IBS (irritable bowel syndrome)     resolved with stopping metforming    Irregular heart beat     pt reports one time had irregular heart beat noted on EKG    Menorrhagia     Migraine without aura     Morbid obesity due to excess calories (HCC)     Last Assessed:1/21/16    Motion sickness     PCOS (polycystic ovarian syndrome)     Shortness of breath     exertional    Tooth missing     "gums are receding to have surgery in the future"     Past Surgical History:   Procedure Laterality Date    DILATION AND EVACUATION  12/15/2017    ETOP    INSERTION OF INTRAUTERINE DEVICE (IUD)  10/18/2018    KYLEENA    VA LAP,RMV  ADNEXAL STRUCTURE Bilateral 8/14/2019    Procedure: SALPINGECTOMY, LAPAROSCOPIC;  Surgeon:  Dilshad Lambert MD;  Location: [facility];  Service: Gynecology    REMOVAL OF INTRAUTERINE DEVICE (IUD)      TONSILLECTOMY      WISDOM TOOTH EXTRACTION       Social History   Social History     Substance and Sexual Activity   Alcohol Use Yes    Alcohol/week: 2 0 standard drinks    Types: 2 Glasses of wine per week    Frequency: Monthly or less    Comment: special occasions     Social History     Substance and Sexual Activity   Drug Use Not Currently    Types: Marijuana    Comment: occasionally     Social History     Tobacco Use   Smoking Status Never Smoker   Smokeless Tobacco Never Used     Family History: non-contributory    Meds/Allergies   all medications and allergies reviewed  Allergies   Allergen Reactions    Amoxicillin Other (See Comments)     Yeast infection       Objective       Current Vitals:   Blood Pressure: 136/84 (12/12/19 1519)  Pulse: 74 (12/12/19 1519)  Temperature: 97 9 °F (36 6 °C) (12/12/19 1519)  Respirations: 18 (12/12/19 1519)  Height: 5' 5" (165 1 cm) (12/12/19 1519)  Weight - Scale: 126 kg (278 lb 8 oz) (12/12/19 1519)        Invasive Devices     None                 Physical Exam Constitutional: She is oriented to person, place, and time  She appears well-developed and well-nourished  No distress  HENT:   Head: Normocephalic and atraumatic  Right Ear: External ear normal    Left Ear: External ear normal    Nose: Nose normal    Eyes: Conjunctivae are normal  Right eye exhibits no discharge  Left eye exhibits no discharge  No scleral icterus  Neurological: She is alert and oriented to person, place, and time  Skin: She is not diaphoretic  Psychiatric: She has a normal mood and affect  Her behavior is normal  Judgment and thought content normal    Vitals reviewed  Lab Results: I have personally reviewed pertinent lab results  Imaging: I have personally reviewed pertinent reports  EKG, Pathology, and Other Studies: I have personally reviewed pertinent reports  Assessment/PLAN:    34 y o  yo female with a long standing h/o of obesity and inability to sustain any meaningful weight loss on her own despite several attempts  She is interested in the Laparoscopic Sleeve gastrectomy or gastric bypass  As a part of her pre op evaluation, she will be referred to a cardiologist and for a sleep evaluation and consult  She needs an EGD to evaluate the anatomy of her GI tract prior to the operation  I have spent over 45 minutes with her face to face in the office today discussing her options and details of the surgery  We have seen an animation of the surgery on the computer that illustrates how the operation is done and how the anatomy will be altered with the procedure  Over 50% of this was coordinating care  She was given the opportunity to ask questions and I have answered all of them  I have discussed and educated the patient with regards to the components of our multidisciplinary program and the importance of compliance and follow up in the post operative period   The patient was also instructed with regards to the importance of behavior modification, nutritional counseling, support meeting attendance and lifestyle changes that are important to ensure success  Although there is a great statistical chance of improvement or even resolution of most of her associated comorbidities, the results vary from patient to patient and they largely depend on her commitment and compliance  She needs to lose 17 lbs prior to the operation        Kadi Salazar MD  12/12/2019  4:04 PM

## 2019-12-12 NOTE — PROGRESS NOTES
BARIATRIC INITIAL CONSULT - BARIATRIC SURGERY    Nichole Akhtar 34 y o  female MRN: [id number]  Unit/Bed#:  Encounter: [id number]      HPI:  Lele Ralph is a 34 y o  female who presents with a longstanding history of morbid obesity and inability to sustain a meaningful weight loss  Here today to discuss bariatric options  Visit type: initial visit    Symptoms: excess weight and inability to loss weight    Associated Symptoms: depressed mood and anxiety    Associated Conditions: hyperlipidemia, abdominal obesity and hypergycemia  Disease Complications: diabetes and Hyperlipidemia & PCOS  Weight Loss Interest: high  Previous Diet Trials: low calorie     Exercise Frequency:infrequency  Types of Exercise: walking        Review of Systems   Gastrointestinal:        Occasional heartburn   All other systems reviewed and are negative        Historical Information   Past Medical History:   Diagnosis Date    ADD (attention deficit disorder)     Anxiety     Blood type A+     pt unsure of blood type    Depression     [facility]     per Allscripts: Type 2    Dietary calcium deficiency     Last Assessed:8/18/15/pt denies    Dry eyes, bilateral     Dysmenorrhea     adequate response to IBuprofen; Last Assessed:11/23/16    GERD (gastroesophageal reflux disease)     occas    Hematuria     Last Assessed:12/20/16/not currently pt reports    History of kidney stones     Hypercholesterolemia     not currently pt reports    IBS (irritable bowel syndrome)     resolved with stopping metforming    Irregular heart beat     pt reports one time had irregular heart beat noted on EKG    Menorrhagia     Migraine without aura     Morbid obesity due to excess calories (HCC)     Last Assessed:1/21/16    Motion sickness     PCOS (polycystic ovarian syndrome)     Shortness of breath     exertional    Tooth missing     "gums are receding to have surgery in the future"     Past Surgical History: Procedure Laterality Date    DILATION AND EVACUATION  12/15/2017    ETOP    INSERTION OF INTRAUTERINE DEVICE (IUD)  10/18/2018    KYLEENA    [place] LAP,RMV  ADNEXAL STRUCTURE Bilateral 8/14/2019    Procedure: SALPINGECTOMY, LAPAROSCOPIC;  Surgeon: Krista Jackson MD;  Location: [facility];  Service: Gynecology    REMOVAL OF INTRAUTERINE DEVICE (IUD)      TONSILLECTOMY      WISDOM TOOTH EXTRACTION       Social History   Social History     Substance and Sexual Activity   Alcohol Use Yes    Alcohol/week: 2 0 standard drinks    Types: 2 Glasses of wine per week    Frequency: Monthly or less    Comment: special occasions     Social History     Substance and Sexual Activity   Drug Use Not Currently    Types: Marijuana    Comment: occasionally     Social History     Tobacco Use   Smoking Status Never Smoker   Smokeless Tobacco Never Used     Family History: non-contributory    Meds/Allergies   all medications and allergies reviewed  Allergies   Allergen Reactions    Amoxicillin Other (See Comments)     Yeast infection       Objective       Current Vitals:   Blood Pressure: 136/84 (12/12/19 1519)  Pulse: 74 (12/12/19 1519)  Temperature: 97 9 °F (36 6 °C) (12/12/19 1519)  Respirations: 18 (12/12/19 1519)  Height: 5' 5" (165 1 cm) (12/12/19 1519)  Weight - Scale: 126 kg (278 lb 8 oz) (12/12/19 1519)        Invasive Devices     None                 Physical Exam   Constitutional: She is oriented to person, place, and time  She appears well-developed and well-nourished  No distress  HENT:   Head: Normocephalic and atraumatic  Right Ear: External ear normal    Left Ear: External ear normal    Nose: Nose normal    Eyes: Conjunctivae are normal  Right eye exhibits no discharge  Left eye exhibits no discharge  No scleral icterus  Neurological: She is alert and oriented to person, place, and time  Skin: She is not diaphoretic  Psychiatric: She has a normal mood and affect   Her behavior is normal  Judgment and thought content normal    Vitals reviewed  Lab Results: I have personally reviewed pertinent lab results  Imaging: I have personally reviewed pertinent reports  EKG, Pathology, and Other Studies: I have personally reviewed pertinent reports  Assessment/PLAN:    34 y o  yo female with a long standing h/o of obesity and inability to sustain any meaningful weight loss on her own despite several attempts  She is interested in the Laparoscopic Sleeve gastrectomy or gastric bypass  As a part of her pre op evaluation, she will be referred to a cardiologist and for a sleep evaluation and consult  She needs an EGD to evaluate the anatomy of her GI tract prior to the operation  I have spent over 45 minutes with her face to face in the office today discussing her options and details of the surgery  We have seen an animation of the surgery on the computer that illustrates how the operation is done and how the anatomy will be altered with the procedure  Over 50% of this was coordinating care  She was given the opportunity to ask questions and I have answered all of them  I have discussed and educated the patient with regards to the components of our multidisciplinary program and the importance of compliance and follow up in the post operative period  The patient was also instructed with regards to the importance of behavior modification, nutritional counseling, support meeting attendance and lifestyle changes that are important to ensure success  Although there is a great statistical chance of improvement or even resolution of most of her associated comorbidities, the results vary from patient to patient and they largely depend on her commitment and compliance  She needs to lose 17 lbs prior to the operation        Anton Andrew MD  12/12/2019  4:04 PM

## 2020-01-09 ENCOUNTER — OFFICE VISIT (OUTPATIENT)
Dept: BARIATRICS | Facility: CLINIC | Age: 30
End: 2020-01-09

## 2020-01-09 VITALS — WEIGHT: 280 LBS | BODY MASS INDEX: 46.59 KG/M2

## 2020-01-09 DIAGNOSIS — E66.01 MORBID (SEVERE) OBESITY DUE TO EXCESS CALORIES (HCC): Primary | ICD-10-CM

## 2020-01-09 PROCEDURE — RECHECK: Performed by: DIETITIAN, REGISTERED

## 2020-01-09 NOTE — PROGRESS NOTES
Bariatric Follow Up Nutrition Note    Preop  6 Month Program    Type of surgery    Preop 6 month program  Surgery Date: TBD- Tentative June 2020  Surgeon: Dr Joey Espinosa  34 y o   female  Wt 127 kg (280 lb)   BMI 46 59 kg/m²     [address] [place] Equation:  2378 kcal/day for weight maintenance  1378 kcal/day=2lb/wk wt loss  Weight on Day of Weight Loss Surgery: 5% wt loss=13 84#= Day of surgery weight goal of 262 96#  Wt with BMI of 25: 159 3lbs  Pre-Op Excess Wt: 117 5lbs    Review of History and Medications   Past Medical History:   Diagnosis Date    ADD (attention deficit disorder)     Anxiety     Blood type A+     pt unsure of blood type    Depression     Diabetes mellitus ([facility] )     per Allscripts: Type 2    Dietary calcium deficiency     Last Assessed:8/18/15/pt denies    Dry eyes, bilateral     Dysmenorrhea     adequate response to IBuprofen; Last Assessed:11/23/16    GERD (gastroesophageal reflux disease)     occas    Hematuria     Last Assessed:12/20/16/not currently pt reports    History of kidney stones     Hypercholesterolemia     not currently pt reports    IBS (irritable bowel syndrome)     resolved with stopping metforming    Irregular heart beat     pt reports one time had irregular heart beat noted on EKG    Menorrhagia     Migraine without aura     Morbid obesity due to excess calories (HCC)     Last Assessed:1/21/16    Motion sickness     PCOS (polycystic ovarian syndrome)     Shortness of breath     exertional    Tooth missing     "gums are receding to have surgery in the future"     Past Surgical History:   Procedure Laterality Date    DILATION AND EVACUATION  12/15/2017    ETOP    INSERTION OF INTRAUTERINE DEVICE (IUD)  10/18/2018    KYLEENA    [place] LAP,RMV  ADNEXAL STRUCTURE Bilateral 8/14/2019    Procedure: SALPINGECTOMY, LAPAROSCOPIC;  Surgeon:  Carl Edwards MD;  Location: [facility];  Service: Gynecology    REMOVAL OF INTRAUTERINE DEVICE (IUD)      TONSILLECTOMY      WISDOM TOOTH EXTRACTION       Social History     Socioeconomic History    Marital status: Single     Spouse name: Not on file    Number of children: 0    Years of education: HS, cosmetology    Highest education level: Not on file   Occupational History    Occupation: beauty/hair salon   Social Needs    Financial resource strain: Not on file    Food insecurity:     Worry: Not on file     Inability: Not on file    Transportation needs:     Medical: Not on file     Non-medical: Not on file   Tobacco Use    Smoking status: Never Smoker    Smokeless tobacco: Never Used   Substance and Sexual Activity    Alcohol use:  Yes     Alcohol/week: 2 0 standard drinks     Types: 2 Glasses of wine per week     Frequency: Monthly or less     Comment: special occasions    Drug use: Not Currently     Types: Marijuana     Comment: occasionally    Sexual activity: Not on file   Lifestyle    Physical activity:     Days per week: Not on file     Minutes per session: Not on file    Stress: Not on file   Relationships    Social connections:     Talks on phone: Not on file     Gets together: Not on file     Attends Buddhism service: Not on file     Active member of club or organization: Not on file     Attends meetings of clubs or organizations: Not on file     Relationship status: Not on file    Intimate partner violence:     Fear of current or ex partner: Not on file     Emotionally abused: Not on file     Physically abused: Not on file     Forced sexual activity: Not on file   Other Topics Concern    Not on file   Social History Narrative    Inadequate Exercise-none    Inappropriate diet and eating habits    Education: GED    Advent: Sabianist non Yazidism    Accepts blood products    Calcium: irregular vitamin use, 1 c almond 3x/week; occ cheese, 1 yogurt 3-4x/week           Current Outpatient Medications:     ACCU-CHEK SMARTVIEW test strip, , Disp: , Rfl:     amphetamine-dextroamphetamine (ADDERALL) 15 MG tablet, daily as needed , Disp: , Rfl: 0    APPLE CIDER VINEGAR PO, Take by mouth, Disp: , Rfl:     BYETTA 10 MCG PEN 10 MCG/0 04ML SOPN, INJECT 10 MCG UNDER THE SKIN TWO TIMES A DAY BEFORE MEALS, Disp: , Rfl: 1    CAREONE UNIFINE PENTIPS PLUS 32G X 4 MM MISC, INJECT TWO TIMES A DAY WITH BYLINK, Disp: , Rfl: 3    Cholecalciferol (VITAMIN D3 PO), Take by mouth , Disp: , Rfl:     Cyanocobalamin (B-12 PO), Take by mouth, Disp: , Rfl:     escitalopram (LEXAPRO) 5 mg tablet, , Disp: , Rfl: 0    insulin glargine (LANTUS) 100 units/mL subcutaneous injection, Inject 23 Units under the skin daily at bedtime, Disp: , Rfl: 0    metFORMIN (GLUCOPHAGE-XR) 500 mg 24 hr tablet, Take 1 tablet (500 mg total) by mouth 2 (two) times a day, Disp: , Rfl: 0    Omega-3 Fatty Acids (OMEGA 3 PO), Take by mouth, Disp: , Rfl:     Food Intake and Lifestyle Assessment   Food Intake Assessment completed via usual diet recall  Breakfast: 1/2 banana, 1/4 cup nuts, protein oatmeal, or [employer] shake  Sometimes skips breakfast   Snack: none   Lunch: usually skips when at work  Snack: none  Dinner: Pt's grandmother will cook meat, vegetable, potato OR pt will have cereal with almond milk  Snack: none  Beverage intake: water, coffee  Pt has not been drinking gatorade  Protein supplement: occasional [employer] shake for breakfast  Estimated protein intake per day: 30-60g  Estimated fluid intake per day: >64oz water  Meals eaten away from home: 1-2  Typical meal pattern: 2 meals per day and 0 snacks per day  Eating Behaviors: Consumption of high calorie/ high fat foods, Consumption of high calorie beverages, Large portion sizes and skips meals regularly  Food allergies or intolerances:          Allergies   Allergen Reactions    Amoxicillin Other (See Comments)       Yeast infection      Cultural or Christian considerations: none noted     Physical Assessment  Physical Activity  Types of exercise: Walking her dog 15 minutes daily  Current physical limitations: none     Psychosocial Assessment   Support systems: relative(s)  Socioeconomic factors: lives with her grandparents  Pt reports her mother had bariatric surgery and has maintained a large weight loss  Pt reports she works long hours      Nutrition Diagnosis-Continued  Diagnosis: Overweight / Obesity (NC-3 3), Excessive energy intake (NI-1 5) and Disordered eating pattern (NB-1 5)  Related to: Physical inactivity, Excessive energy intake and Inability or lack of desire to manage self-care  As Evidenced by: BMI >25, Excessive energy intake, Reports of disorded eating patterns and frequently skipping meals and going all day without eating     Interventions and Teaching   Patient educated on post-op nutrition guidelines  Patient educated and handouts provided  Capacity of post-surgery stomach  Adequate hydration  Expected weight loss  Exercise  Suggestions for pre-op diet  Meal planning and preparation  Appropriate carbohydrate, protein, and fat intake, and food/fluid choices to maximize safe weight loss, nutrient intake, and tolerance   Dietary and lifestyle changes  Techniques for self monitoring and keeping daily food journal    Education provided to: patient     Barriers to learning: No barriers identified  Readiness to change: preparation     Prior research on procedure: discussed with provider and pre-op class     Comprehension: needs reinforcement and verbalizes understanding      Expected Compliance: good    Evaluation / Monitoring  Dietitian to Monitor: Eating pattern as discussed Tolerance of nutrition prescription Body weight Lab values Physical activity     Goals  Eliminate sugar sweetened beverages, Food journal, Exercise 30 minutes 5 times per week, Complete lession plans 1-6 and Eat 3 meals per day     Workflow:   PCP Letter:   Support Group: Provided new flier today     6 Month Pre-Operative Program: 2 of 6 today  3 of 6 scheduled for 2/13/2020 with SW+RD   Bloodwork: Ordered 12/12/2019   Cardiac Risk Assessment: scheduled for 2/10/2020   EGD: scheduled for 4/8/2020     Weight Loss: 5% wt loss=13 84#= Day of surgery weight goal of 262 96#    Time Spent:   30 Minutes

## 2020-02-05 ENCOUNTER — TELEPHONE (OUTPATIENT)
Dept: BARIATRICS | Facility: CLINIC | Age: 30
End: 2020-02-05

## 2020-02-10 ENCOUNTER — CONSULT (OUTPATIENT)
Dept: CARDIOLOGY CLINIC | Facility: CLINIC | Age: 30
End: 2020-02-10
Payer: COMMERCIAL

## 2020-02-10 VITALS
BODY MASS INDEX: 45.98 KG/M2 | DIASTOLIC BLOOD PRESSURE: 82 MMHG | WEIGHT: 276 LBS | HEIGHT: 65 IN | SYSTOLIC BLOOD PRESSURE: 130 MMHG | HEART RATE: 87 BPM

## 2020-02-10 DIAGNOSIS — E78.5 HYPERLIPIDEMIA, UNSPECIFIED HYPERLIPIDEMIA TYPE: ICD-10-CM

## 2020-02-10 DIAGNOSIS — E11.65 TYPE 2 DIABETES MELLITUS WITH HYPERGLYCEMIA, WITH LONG-TERM CURRENT USE OF INSULIN (HCC): ICD-10-CM

## 2020-02-10 DIAGNOSIS — Z79.4 TYPE 2 DIABETES MELLITUS WITH HYPERGLYCEMIA, WITH LONG-TERM CURRENT USE OF INSULIN (HCC): ICD-10-CM

## 2020-02-10 DIAGNOSIS — E66.01 MORBID OBESITY (HCC): Primary | ICD-10-CM

## 2020-02-10 DIAGNOSIS — Z72.3 INADEQUATE EXERCISE: ICD-10-CM

## 2020-02-10 PROCEDURE — 93000 ELECTROCARDIOGRAM COMPLETE: CPT

## 2020-02-10 PROCEDURE — 99214 OFFICE O/P EST MOD 30 MIN: CPT

## 2020-02-10 NOTE — PROGRESS NOTES
Cardiology Consultation   MD Delio Castelan MD Ather Mansoor, MD Edilia France, DO, Melissa Nance DO, [facility] - [place]  -------------------------------------------------------------------  [facility]  [address] [place] [id number]  [phone]  [phone]  02/10/20  Gregg Hoskins  YOB: 1990   MRN: [id number]      [facility]     HPI:  I am seeing this patient in cardiology consultation for:  Preoperative cardiac risk assessment    Gregg Hoskins is a 34 y o  female with morbid obesity, diabetes, gastroesophageal reflux disease, family history of coronary artery disease apparently in her father who  in his 46s, however he was also a very uncontrolled alcoholic, who presents today for preoperative cardiac risk assessment prior to gastric bypass surgery  She has made several times in the past to lose weight but has unfortunately been unsuccessful  She states that she never gets chest pain, is able to exercise by going to the gym and doing a Storm class, as well as with her job climbs multiple flights of steps many times throughout the day, without symptoms  She has no known cardiac history herself  She denies any palpitations, syncope, presyncope, lightheadedness, dizziness, chest pain, shortness of breath at rest or with exertion, paroxysmal nocturnal dyspnea  Her electrocardiogram today shows sinus rhythm 87 beats per minute with very nonspecific T-wave flattening in the inferior and lateral leads  Review of Systems   Constitutional: Negative for chills and fever  HENT: Negative for facial swelling and sore throat  Eyes: Negative for visual disturbance  Respiratory: Negative for cough, chest tightness, shortness of breath and wheezing  Cardiovascular: Negative for chest pain, palpitations and leg swelling     Gastrointestinal: Negative for abdominal pain, blood in stool, constipation, diarrhea, nausea and vomiting  Endocrine: Negative for cold intolerance and heat intolerance  Genitourinary: Negative for decreased urine volume, difficulty urinating, dysuria and hematuria  Musculoskeletal: Negative for arthralgias, back pain and myalgias  Skin: Negative for rash  Neurological: Negative for dizziness, syncope, weakness and numbness  Psychiatric/Behavioral: Negative for agitation, behavioral problems and confusion  The patient is not nervous/anxious  OBJECTIVE  Vitals:    02/10/20 1403   BP: 160/80   Pulse: 87       Physical Exam   General appearance: alert and oriented, in no acute distress, morbidly obese female  Head: Normocephalic, without obvious abnormality, atraumatic  Eyes: conjunctivae/corneas clear  Anicteric  Neck: no adenopathy, no carotid bruit, no JVD  Lungs: clear to auscultation bilaterally  Heart: regular rate and rhythm, S1, S2 normal, no murmur, no click, rub or gallop  Abdomen: soft, obese abdomen, non-tender; bowel sounds normal; no masses,  no organomegaly  Extremities: extremities normal, warm and well-perfused; no cyanosis, clubbing, or edema  Skin: Skin color, texture, turgor normal  No rashes or lesions     EKG:  No results found for this visit on 02/10/20  IMPRESSION:  1  Preoperative cardiac risk assessment prior to gastric bypass surgery  2  Morbid obesity  3  Diabetes  4  Gastroesophageal reflux disease    DISCUSSION/RECOMMENDATIONS:   At this time she is stable   She has no symptoms of angina   She is active, able to exercise, perform a complete Storm class without having to stop, as well as climbing multiple flights of steps throughout the day during her regular work today  She is definitely able perform greater than 4 5 Mets     Her EKG for the most part is normal but has some nonspecific T-wave changes in inferolateral leads, that may just be associated with her body habitus   Given her lack of symptoms, and good exercise capacity, I believe she will be low risk for upcoming gastric bypass surgery  Her physical exam does not suggest any structural heart disease  She has no cardiac murmur  I do not think she needs a echocardiogram or stress test prior to surgery   Initially today her blood pressure was elevated, however on recheck I personally measured to be 130/82   She may follow with Cardiology as needed, no specific follow-up appointment is necessary at this time  --------------------------------------------------------------------------------  TREADMILL STRESS  No results found for this or any previous visit    ----------------------------------------------------------------------------------------------  NUCLEAR STRESS TEST: No results found for this or any previous visit  No results found for this or any previous visit     --------------------------------------------------------------------------------  CATH:  No results found for this or any previous visit   --------------------------------------------------------------------------------  ECHO:   No results found for this or any previous visit  No results found for this or any previous visit   --------------------------------------------------------------------------------  HOLTER  No results found for this or any previous visit   --------------------------------------------------------------------------------  CAROTIDS  No results found for this or any previous visit       Diagnoses and all orders for this visit:    Morbid obesity ([address] )  -     Ambulatory referral to Cardiology  -     POCT ECG    Type 2 diabetes mellitus with hyperglycemia, with long-term current use of insulin ([facility])    Body mass index (BMI) of 40 0-44 9 in adult [facility])    Inadequate exercise    Hyperlipidemia, unspecified hyperlipidemia type       ======================================================    Past Medical History:   Diagnosis Date  ADD (attention deficit disorder)     Anxiety     Blood type A+     pt unsure of blood type    Depression     Diabetes mellitus ([facility] [address] )     per Allscripts: Type 2    Dietary calcium deficiency     Last Assessed:8/18/15/pt denies    Dry eyes, bilateral     Dysmenorrhea     adequate response to IBuprofen; Last Assessed:11/23/16    GERD (gastroesophageal reflux disease)     occas    Hematuria     Last Assessed:12/20/16/not currently pt reports    History of kidney stones     Hypercholesterolemia     not currently pt reports    IBS (irritable bowel syndrome)     resolved with stopping metforming    Irregular heart beat     pt reports one time had irregular heart beat noted on EKG    Menorrhagia     Migraine without aura     Morbid obesity due to excess calories (HCC)     Last Assessed:1/21/16    Motion sickness     PCOS (polycystic ovarian syndrome)     Shortness of breath     exertional    Tooth missing     "gums are receding to have surgery in the future"     Past Surgical History:   Procedure Laterality Date    DILATION AND EVACUATION  12/15/2017    ETOP    INSERTION OF INTRAUTERINE DEVICE (IUD)  10/18/2018    KYLEENA    HI LAP,RMV  ADNEXAL STRUCTURE Bilateral 8/14/2019    Procedure: SALPINGECTOMY, LAPAROSCOPIC;  Surgeon:  New Pinto MD;  Location: [facility];  Service: Gynecology    REMOVAL OF INTRAUTERINE DEVICE (IUD)      TONSILLECTOMY      WISDOM TOOTH EXTRACTION           Medications  Current Outpatient Medications   Medication Sig Dispense Refill    ACCU-CHEK SMARTVIEW test strip       amphetamine-dextroamphetamine (ADDERALL) 15 MG tablet daily as needed   0    APPLE CIDER VINEGAR PO Take by mouth      BYETTA 10 MCG PEN 10 MCG/0 04ML SOPN INJECT 10 MCG UNDER THE SKIN TWO TIMES A DAY BEFORE MEALS  1    CAREONE UNIFINE PENTIPS PLUS 32G X 4 MM MISC INJECT TWO TIMES A DAY WITH BYETTA  3    Cholecalciferol (VITAMIN D3 PO) Take by mouth       Cyanocobalamin (B-12 PO) Take by mouth  insulin glargine (LANTUS) 100 units/mL subcutaneous injection Inject 23 Units under the skin daily at bedtime  0    metFORMIN (GLUCOPHAGE-XR) 500 mg 24 hr tablet Take 1 tablet (500 mg total) by mouth 2 (two) times a day  0    Omega-3 Fatty Acids (OMEGA 3 PO) Take by mouth       No current facility-administered medications for this visit  Allergies   Allergen Reactions    Amoxicillin Other (See Comments)     Yeast infection       Social History     Socioeconomic History    Marital status: Single     Spouse name: Not on file    Number of children: 0    Years of education: HS, cosmetology    Highest education level: Not on file   Occupational History    Occupation: beauty/hair salon   Social Needs    Financial resource strain: Not on file    Food insecurity:     Worry: Not on file     Inability: Not on file    Transportation needs:     Medical: Not on file     Non-medical: Not on file   Tobacco Use    Smoking status: Never Smoker    Smokeless tobacco: Never Used   Substance and Sexual Activity    Alcohol use:  Yes     Alcohol/week: 2 0 standard drinks     Types: 2 Glasses of wine per week     Frequency: Monthly or less     Comment: special occasions    Drug use: Not Currently     Types: Marijuana     Comment: occasionally    Sexual activity: Not on file   Lifestyle    Physical activity:     Days per week: Not on file     Minutes per session: Not on file    Stress: Not on file   Relationships    Social connections:     Talks on phone: Not on file     Gets together: Not on file     Attends [employer] service: Not on file     Active member of club or organization: Not on file     Attends meetings of clubs or organizations: Not on file     Relationship status: Not on file    Intimate partner violence:     Fear of current or ex partner: Not on file     Emotionally abused: Not on file     Physically abused: Not on file     Forced sexual activity: Not on file   Other Topics Concern    Not on file   Social History Narrative    Inadequate Exercise-none    Inappropriate diet and eating habits    Education: GED    Samaritan: Jain non Gnosticism    Accepts blood products    Calcium: irregular vitamin use, 1 c almond 3x/week; occ cheese, 1 yogurt 3-4x/week            Family History   Problem Relation Age of Onset    Bipolar disorder Mother    [facility] Migraines Mother     Obesity Mother     Stroke Father     Diabetes type II Father     Alcohol abuse Father     Heart disease Father     Diabetes Father     Obesity Father     Osteoporosis Maternal Grandmother     Lymphoma Maternal Grandmother     Heart disease Maternal Grandfather         Ischemic    Prostate cancer Maternal Grandfather     Diabetes type II Paternal Grandmother     Obesity Sister     Breast cancer Neg Hx     Colon cancer Neg Hx     Ovarian cancer Neg Hx        Lab Results   Component Value Date    WBC 15 47 (H) 08/15/2019    HGB 12 4 08/15/2019    HCT 38 8 08/15/2019    MCV 85 08/15/2019     08/15/2019      Lab Results   Component Value Date    SODIUM 139 08/14/2019    K 4 1 08/14/2019     08/14/2019    CO2 28 08/14/2019    BUN 11 08/14/2019    CREATININE 0 66 08/14/2019    GLUC 206 (H) 08/14/2019    CALCIUM 8 7 08/14/2019      Lab Results   Component Value Date    HGBA1C 9 1 (H) 08/12/2019      No results found for: CHOL  No results found for: HDL  No results found for: LDLCALC  No results found for: TRIG  No results found for: CHOLHDL   No results found for: INR, PROTIME       Patient Active Problem List    Diagnosis Date Noted    Status post bilateral salpingectomy 08/14/2019    Encounter for sterilization 05/21/2019    Vitamin D deficiency 05/14/2019    Hyperlipidemia 05/13/2019    Encounter for annual routine gynecological examination 10/01/2018    Pap smear for cervical cancer screening 10/01/2018    Contraceptive education 10/01/2018    Dietary calcium deficiency 10/01/2018    Inadequate exercise 10/01/2018    Type 2 diabetes mellitus ([facility] [address] ) 07/13/2015    PCOS (polycystic ovarian syndrome) 04/06/2015    Attention deficit disorder 05/15/2014    Body mass index (BMI) of 40 0-44 9 in adult [facility]) 04/14/2014    Allergic rhinitis 01/14/2014       Portions of the record may have been created with voice recognition software  Occasional wrong word or "sound a like" substitutions may have occurred due to the inherent limitations of voice recognition software  Read the chart carefully and recognize, using context, where substitutions have occurred        Olivia Gustafson DO, [facility] [place]  2/10/2020 2:43 PM

## 2020-02-13 ENCOUNTER — OFFICE VISIT (OUTPATIENT)
Dept: BARIATRICS | Facility: CLINIC | Age: 30
End: 2020-02-13

## 2020-02-13 VITALS — WEIGHT: 275.2 LBS | BODY MASS INDEX: 45.8 KG/M2

## 2020-02-13 PROCEDURE — RECHECK: Performed by: DIETITIAN, REGISTERED

## 2020-02-13 NOTE — PROGRESS NOTES
Bariatric Follow Up Nutrition Note    Preop  6 Month Program    Type of surgery    Preop 6 month program: 4 of 6 today  Surgery Date: TBD- Tentative June 2020  Surgeon: Dr Woodward Scales  34 y o   female  Wt 125 kg (275 lb 3 2 oz)   BMI 45 80 kg/m²     1765 Cal [email or web address] Equation:  2378 kcal/day for weight maintenance  1378 kcal/day=2lb/wk wt loss  Weight on Day of Weight Loss Surgery: 5% wt loss=13 84#= Day of surgery weight goal of 262 96#  Wt with BMI of 25: 159 3lbs  Pre-Op Excess Wt: 117 5lbs  4 8# wt loss since last month  1 6# net wt loss from 3-hr eval in November 2019  Pt needs additional 12  24# wt loss prior to surgery      Review of History and Medications   Past Medical History:   Diagnosis Date    ADD (attention deficit disorder)     Anxiety     Blood type A+     pt unsure of blood type    Depression     Diabetes mellitus ([address] )     per Allscripts: Type 2    Dietary calcium deficiency     Last Assessed:8/18/15/pt denies    Dry eyes, bilateral     Dysmenorrhea     adequate response to IBuprofen; Last Assessed:11/23/16    GERD (gastroesophageal reflux disease)     occas    Hematuria     Last Assessed:12/20/16/not currently pt reports    History of kidney stones     Hypercholesterolemia     not currently pt reports    IBS (irritable bowel syndrome)     resolved with stopping metforming    Irregular heart beat     pt reports one time had irregular heart beat noted on EKG    Menorrhagia     Migraine without aura     Morbid obesity due to excess calories (HCC)     Last Assessed:1/21/16    Motion sickness     PCOS (polycystic ovarian syndrome)     Shortness of breath     exertional    Tooth missing     "gums are receding to have surgery in the future"     Past Surgical History:   Procedure Laterality Date    DILATION AND EVACUATION  12/15/2017    ETOP    INSERTION OF INTRAUTERINE DEVICE (IUD)  10/18/2018    RADHA MALDONADO LAP,RMV ADNEXAL STRUCTURE Bilateral 8/14/2019    Procedure: SALPINGECTOMY, LAPAROSCOPIC;  Surgeon: Krista Jackson MD;  [facility];  Service: Gynecology    REMOVAL OF INTRAUTERINE DEVICE (IUD)      TONSILLECTOMY      WISDOM TOOTH EXTRACTION       Social History     Socioeconomic History    Marital status: Single     Spouse name: Not on file    Number of children: 0    Years of education: HS, cosmetology    Highest education level: Not on file   Occupational History    Occupation: beauty/hair salon   Social Needs    Financial resource strain: Not on file    Food insecurity:     Worry: Not on file     Inability: Not on file    Transportation needs:     Medical: Not on file     Non-medical: Not on file   Tobacco Use    Smoking status: Never Smoker    Smokeless tobacco: Never Used   Substance and Sexual Activity    Alcohol use:  Yes     Alcohol/week: 2 0 standard drinks     Types: 2 Glasses of wine per week     Frequency: Monthly or less     Comment: special occasions    Drug use: Not Currently     Types: Marijuana     Comment: occasionally    Sexual activity: Not on file   Lifestyle    Physical activity:     Days per week: Not on file     Minutes per session: Not on file    Stress: Not on file   Relationships    Social connections:     Talks on phone: Not on file     Gets together: Not on file     Attends Islam service: Not on file     Active member of club or organization: Not on file     Attends meetings of clubs or organizations: Not on file     Relationship status: Not on file    Intimate partner violence:     Fear of current or ex partner: Not on file     Emotionally abused: Not on file     Physically abused: Not on file     Forced sexual activity: Not on file   Other Topics Concern    Not on file   Social History Narrative    Inadequate Exercise-none    Inappropriate diet and eating habits    Education: GED    Zoroastrian: Mu-ism non Sikhism    Accepts blood products    Calcium: irregular vitamin use, 1 c almond 3x/week; occ cheese, 1 yogurt 3-4x/week           Current Outpatient Medications:     ACCU-CHEK SMARTVIEW test strip, , Disp: , Rfl:     amphetamine-dextroamphetamine (ADDERALL) 15 MG tablet, daily as needed , Disp: , Rfl: 0    APPLE CIDER VINEGAR PO, Take by mouth, Disp: , Rfl:     BYETTA 10 MCG PEN 10 MCG/0 04ML SOPN, INJECT 10 MCG UNDER THE SKIN TWO TIMES A DAY BEFORE MEALS, Disp: , Rfl: 1    CAREONE UNIFINE PENTIPS PLUS 32G X 4 MM MISC, INJECT TWO TIMES A DAY WITH BYETTA, Disp: , Rfl: 3    Cholecalciferol (VITAMIN D3 PO), Take by mouth , Disp: , Rfl:     Cyanocobalamin (B-12 PO), Take by mouth, Disp: , Rfl:     insulin glargine (LANTUS) 100 units/mL subcutaneous injection, Inject 23 Units under the skin daily at bedtime, Disp: , Rfl: 0    metFORMIN (GLUCOPHAGE-XR) 500 mg 24 hr tablet, Take 1 tablet (500 mg total) by mouth 2 (two) times a day, Disp: , Rfl: 0    Omega-3 Fatty Acids (OMEGA 3 PO), Take by mouth, Disp: , Rfl:     Food Intake and Lifestyle Assessment   Food Intake Assessment completed via usual diet recall  Pt reports she has been keeping a written food log, but forgot to bring it with her today  Breakfast: egg fritattas  Lunch: pt has been prepping healthy salads with light viniagrette dressing to take to work for lunch  Snack: none  Dinner: Pt's grandmother will cook meat, vegetable, potato  Snack: none  Beverage intake: water, coffee  Protein supplement: occasional [employer] shake for breakfast  Estimated protein intake per day: 30-60g  Estimated fluid intake per day: >64oz water  Meals eaten away from home: 1-2  Typical meal pattern: 2 meals per day and 0 snacks per day  Eating Behaviors: Consumption of high calorie/ high fat foods, Consumption of high calorie beverages, Large portion sizes and skips meals regularly  Food allergies or intolerances:          Allergies   Allergen Reactions    Amoxicillin Other (See Comments)       Yeast infection    Cultural or Latter day considerations: none noted     Physical Assessment  Physical Activity  Types of exercise: Walking her dog 15 minutes daily  Pt has also started intentionally doing more flights of stairs at work and at home  Current physical limitations: none     Psychosocial Assessment   Support systems: relative(s)  Socioeconomic factors: lives with her grandparents  Pt reports her mother had bariatric surgery and has maintained a large weight loss  Pt reports she works long hours      Nutrition Diagnosis-Continued/Improving  Diagnosis: Overweight / Obesity (NC-3 3), Excessive energy intake (NI-1 5) and Disordered eating pattern (NB-1 5)  Related to: Physical inactivity, Excessive energy intake and Inability or lack of desire to manage self-care  As Evidenced by: BMI >25, Excessive energy intake, Reports of disorded eating patterns and frequently skipping meals and going all day without eating     Interventions and Teaching   Patient educated on post-op nutrition guidelines  Patient educated and handouts provided  Capacity of post-surgery stomach  Adequate hydration:  Discussed importance of sipping and not gulping/chugging  Discussed importance of 30/60 rule  Expected weight loss  Exercise:  Encouraged pt to add a more structured/planned activity routine and try the resistance exercise plans in Ch 8   Suggestions for pre-op diet  Meal planning and preparation:  Discussed some quick healthy grab-n-go options  Appropriate carbohydrate, protein, and fat intake, and food/fluid choices to maximize safe weight loss, nutrient intake, and tolerance   Dietary and lifestyle changes  Techniques for self monitoring and keeping daily food journal  Discussed pre- and post-operative vitamin recommendations, brief review of Ch 4 in manual   Pt currently takes 5000 IU Vitamin D3 daily  Also instructed pt to take daily adult OTC womens multivitamin with iron once daily      Education provided to: patient     Barriers to learning: No barriers identified  Readiness to change: action     Prior research on procedure: discussed with provider and pre-op class     Comprehension: needs reinforcement and verbalizes understanding      Expected Compliance: good    Evaluation / Monitoring  Dietitian to Monitor: Eating pattern as discussed Tolerance of nutrition prescription Body weight Lab values Physical activity     Goals  Eliminate sugar sweetened beverages, Food journal, Exercise 30 minutes 5 times per week, Complete lession plans 1-6 and Eat 3 meals per day    Session Goals:  1)  [address]  4 in bariatric manual   2)  Bring manual and food logs to next appointment  3)  Get bloodwork drawn  4) Attend Support Group  5) practice 30/60 rule  Workflow:   PCP Letter: Pt states she will schedule appt with PCP once she leaves here today   Support Group: Pt is planning on 2/25/2020 at [place]   6 Month Pre-Operative Program: 3 of 6 today  Scheduled 4 of 6 for March with ALEJO[facility]SW   Bloodwork: Ordered 12/12/2019  Instructed for pt to get done prior to next appt  o Discussed with pt that A1c must be under 9 0% to schedule  Last HgbA1c from august 2019=9 1%   EGD: scheduled for 4/8/2020     Weight Loss: 5% wt loss=13 84#= Day of surgery weight goal of 262 96#    Time Spent:   30 Minutes

## 2020-02-13 NOTE — PROGRESS NOTES
WT CHK 3/6  Patient lost 5 lbs since last visit  Patient is walking more and finding ways to be more active, walking at lunch time; using hand weights at home; exercise at home, weight resistance  Patient is preparing more meals at home; switching up ingredients to make it healthier  (Ground turkey, low fat cheese ) Patient reports drinking water has been a good habit for her; drinks water throughout the day  Struggling with 30/60 rule, taking small sips sometimes  Patient is also working on food journaling, finds it challenging but working to do it at the end of the day  Reviewed workflow; patient plans to attend [facility] this month  Patient has completed her cardiac consultation  Patient scheduled to be seen next month

## 2020-03-19 ENCOUNTER — OFFICE VISIT (OUTPATIENT)
Dept: BARIATRICS | Facility: CLINIC | Age: 30
End: 2020-03-19

## 2020-03-19 ENCOUNTER — TELEPHONE (OUTPATIENT)
Dept: BARIATRICS | Facility: CLINIC | Age: 30
End: 2020-03-19

## 2020-03-19 VITALS — WEIGHT: 273.2 LBS | BODY MASS INDEX: 45.46 KG/M2

## 2020-03-19 DIAGNOSIS — Z79.4 TYPE 2 DIABETES MELLITUS WITH HYPERGLYCEMIA, WITH LONG-TERM CURRENT USE OF INSULIN (HCC): ICD-10-CM

## 2020-03-19 DIAGNOSIS — E28.2 PCOS (POLYCYSTIC OVARIAN SYNDROME): Primary | ICD-10-CM

## 2020-03-19 DIAGNOSIS — E11.65 TYPE 2 DIABETES MELLITUS WITH HYPERGLYCEMIA, WITH LONG-TERM CURRENT USE OF INSULIN (HCC): ICD-10-CM

## 2020-03-19 PROCEDURE — RECHECK: Performed by: DIETITIAN, REGISTERED

## 2020-03-19 NOTE — TELEPHONE ENCOUNTER
DEMETRIS left offering patient Virtual visit for this afternoon  Asked patient to please return call and let me know if she will do phone visit or plan to come into the office

## 2020-03-19 NOTE — PROGRESS NOTES
Bariatric Follow Up Nutrition Note    Preop  6 Month Program    Type of surgery    Preop 6 month program: 4 of 6 today  Surgery Date: TBD- Tentative June 2020  Surgeon: Dr Judge Cowan  34 y o   female  Wt 124 kg (273 lb 3 2 oz)   BMI 45 46 kg/m²     1765 Cal Zamora Equation:  2378 kcal/day for weight maintenance  1378 kcal/day=2lb/wk wt loss  Weight on Day of Weight Loss Surgery: 5% wt loss=13 84#= Day of surgery weight goal of 262 96#  Wt with BMI of 25: 159 3lbs  Pre-Op Excess Wt: 117 5lbs  2# wt loss since last month  3 5# net wt loss from 3-hr eval in November 2019  Pt needs additional 10  24# wt loss prior to surgery      Review of History and Medications   Past Medical History:   Diagnosis Date    ADD (attention deficit disorder)     Anxiety     Blood type A+     pt unsure of blood type    Depression     Diabetes mellitus ([facility] [address] )     per Allscripts: Type 2    Dietary calcium deficiency     Last Assessed:8/18/15/pt denies    Dry eyes, bilateral     Dysmenorrhea     adequate response to IBuprofen; Last Assessed:11/23/16    GERD (gastroesophageal reflux disease)     occas    Hematuria     Last Assessed:12/20/16/not currently pt reports    History of kidney stones     Hypercholesterolemia     not currently pt reports    IBS (irritable bowel syndrome)     resolved with stopping metforming    Irregular heart beat     pt reports one time had irregular heart beat noted on EKG    Menorrhagia     Migraine without aura     Morbid obesity due to excess calories (HCC)     Last Assessed:1/21/16    Motion sickness     PCOS (polycystic ovarian syndrome)     Shortness of breath     exertional    Tooth missing     "gums are receding to have surgery in the future"     Past Surgical History:   Procedure Laterality Date    DILATION AND EVACUATION  12/15/2017    ETOP    INSERTION OF INTRAUTERINE DEVICE (IUD)  10/18/2018    KYLEENA    NM LAP,RMV  ADNEXAL STRUCTURE Bilateral 8/14/2019    Procedure: SALPINGECTOMY, LAPAROSCOPIC;  Surgeon: Lavetta Nageotte, MD;  Location: [facility];  Service: Gynecology    REMOVAL OF INTRAUTERINE DEVICE (IUD)      TONSILLECTOMY      WISDOM TOOTH EXTRACTION       Social History     Socioeconomic History    Marital status: Single     Spouse name: Not on file    Number of children: 0    Years of education: HS, cosmetology    Highest education level: Not on file   Occupational History    Occupation: beauty/hair salon   Social Needs    Financial resource strain: Not on file    Food insecurity:     Worry: Not on file     Inability: Not on file    Transportation needs:     Medical: Not on file     Non-medical: Not on file   Tobacco Use    Smoking status: Never Smoker    Smokeless tobacco: Never Used   Substance and Sexual Activity    Alcohol use:  Yes     Alcohol/week: 2 0 standard drinks     Types: 2 Glasses of wine per week     Frequency: Monthly or less     Comment: special occasions    Drug use: Not Currently     Types: Marijuana     Comment: occasionally    Sexual activity: Not on file   Lifestyle    Physical activity:     Days per week: Not on file     Minutes per session: Not on file    Stress: Not on file   Relationships    Social connections:     Talks on phone: Not on file     Gets together: Not on file     Attends Hindu service: Not on file     Active member of club or organization: Not on file     Attends meetings of clubs or organizations: Not on file     Relationship status: Not on file    Intimate partner violence:     Fear of current or ex partner: Not on file     Emotionally abused: Not on file     Physically abused: Not on file     Forced sexual activity: Not on file   Other Topics Concern    Not on file   Social History Narrative    Inadequate Exercise-none    Inappropriate diet and eating habits    Education: GED    Congregational: Oriental orthodox non Mu-ism    Accepts blood products    Calcium: irregular vitamin use, 1 c almond 3x/week; occ cheese, 1 yogurt 3-4x/week           Current Outpatient Medications:     ACCU-CHEK SMARTVIEW test strip, , Disp: , Rfl:     amphetamine-dextroamphetamine (ADDERALL) 15 MG tablet, daily as needed , Disp: , Rfl: 0    APPLE CIDER VINEGAR PO, Take by mouth, Disp: , Rfl:     BYETTA 10 MCG PEN 10 MCG/0 04ML SOPN, INJECT 10 MCG UNDER THE SKIN TWO TIMES A DAY BEFORE MEALS, Disp: , Rfl: 1    CAREONE UNIFINE PENTIPS PLUS 32G X 4 MM MISC, INJECT TWO TIMES A DAY WITH BYETTA, Disp: , Rfl: 3    Cholecalciferol (VITAMIN D3 PO), Take by mouth , Disp: , Rfl:     Cyanocobalamin (B-12 PO), Take by mouth, Disp: , Rfl:     insulin glargine (LANTUS) 100 units/mL subcutaneous injection, Inject 23 Units under the skin daily at bedtime, Disp: , Rfl: 0    metFORMIN (GLUCOPHAGE-XR) 500 mg 24 hr tablet, Take 1 tablet (500 mg total) by mouth 2 (two) times a day, Disp: , Rfl: 0    Omega-3 Fatty Acids (OMEGA 3 PO), Take by mouth, Disp: , Rfl:     Food Intake and Lifestyle Assessment   Food Intake Assessment completed via usual diet recall  Pt reports she has been keeping a written food log, but forgot to bring it with her today  Breakfast: banana nut cereal with almond milk  Lunch: pt has been prepping healthy salads with light viniagrette dressing to take to work for lunch  Snack: none  Dinner: Pt's grandmother will cook meat, vegetable, potato  Snack: none  Beverage intake: water, coffee  Protein supplement: occasional New Direction shake for breakfast  Estimated protein intake per day: 30-60g  Estimated fluid intake per day: >64oz water  Meals eaten away from home: 1-2  Typical meal pattern: 2 meals per day and 0 snacks per day  Eating Behaviors: Consumption of high calorie/ high fat foods, Consumption of high calorie beverages, Large portion sizes and skips meals regularly  Food allergies or intolerances:          Allergies   Allergen Reactions    Amoxicillin Other (See Comments)       Yeast infection      Cultural or Anabaptism considerations: none noted     Physical Assessment  Physical Activity  Types of exercise: Walking her dog  minutes most days  Pt has also started intentionally doing more flights of stairs at work and at home  Current physical limitations: none     Psychosocial Assessment   Support systems: relative(s)  Socioeconomic factors: lives with her grandparents  Pt reports her mother had bariatric surgery and has maintained a large weight loss  Pt reports she works long hours      Nutrition Diagnosis-Continued/Improving  Diagnosis: Overweight / Obesity (NC-3 3), Excessive energy intake (NI-1 5) and Disordered eating pattern (NB-1 5)  Related to: Physical inactivity, Excessive energy intake and Inability or lack of desire to manage self-care  As Evidenced by: BMI >25, Excessive energy intake, Reports of disorded eating patterns and frequently skipping meals and going all day without eating     Interventions and Teaching   Patient educated on post-op nutrition guidelines  Patient educated and handouts provided  Capacity of post-surgery stomach  Adequate hydration:  Discussed importance of sipping and not gulping/chugging  Discussed importance of 30/60 rule  Expected weight loss  Exercise:  Encouraged pt to add a more structured/planned activity routine and try the resistance exercise plans in Ch 8   Suggestions for pre-op diet  Meal planning and preparation:  Discussed some quick healthy grab-n-go options  Appropriate carbohydrate, protein, and fat intake, and food/fluid choices to maximize safe weight loss, nutrient intake, and tolerance   Dietary and lifestyle changes  Techniques for self monitoring and keeping daily food journal  Discussed pre- and post-operative vitamin recommendations, brief review of Ch 4 in manual   Pt currently takes 5000 IU Vitamin D3 daily  Also instructed pt to take daily adult OTC womens multivitamin with iron once daily      Education provided to: patient     Barriers to learning: No barriers identified  Readiness to change: action     Prior research on procedure: discussed with provider and pre-op class     Comprehension: needs reinforcement and verbalizes understanding      Expected Compliance: good    Evaluation / Monitoring  Dietitian to Monitor: Eating pattern as discussed Tolerance of nutrition prescription Body weight Lab values Physical activity     Goals  Eliminate sugar sweetened beverages, Food journal, Exercise 30 minutes 5 times per week, Complete lession plans 1-6 and Eat 3 meals per day    Session Goals:  1)  [address]  4 in bariatric manual  Complete  2)  Bring manual and food logs to next appointment  Pt did bring manual, did not bring food logs  3)  Get bloodwork drawn: not done  Pt plans to go on Tuesday 3/24  4) Attend Support Group:  Not done  Pt was planning on 3/24 SG  Informed pt of online SG  5) practice 30/60 rule: is doing 30 before and no drinks with meal, not doing well with 60 minutes after eating  Workflow:   PCP Letter: Pt has appt with PCP scheduled for 4/6/2020   Support Group: forwarded pt email with instructions for online support group   6 Month Pre-Operative Program: 4 of 6 today  Scheduled 5 of 6 for April   Bloodwork: Ordered 12/12/2019  Pt plans to get done on 3/24/2020   o Discussed with pt that A1c must be under 9 0% to schedule  Last HgbA1c from August 2019=9 1%   EGD: scheduled for 4/8/2020     Weight Loss: 5% wt loss=13 84#= Day of surgery weight goal of 262 96#    Time Spent:   30 Minutes

## 2020-03-20 NOTE — PROGRESS NOTES
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  Patient concerned about finances being out of work; will be able to get unemployment and grandparents, who she lives with with help out if needed  She doesn't want to have to ask them for help but will if necessary  Patient continues to be active; went for 2 hour hike yesterday  Felt good afterwards  Discussed and focused on what patient has control of; activity, physical distancing from people, mindfulness  Patient continues to adequate water intake; 30/60 rule is a challenge; struggles with the 60, able to do 30  Patient reports being a slow eater  Reviewed workflow; patient encouraged to do online SG while available  Patient plans to have blood work done 3/24  Patient scheduled for next month

## 2020-04-06 ENCOUNTER — TELEPHONE (OUTPATIENT)
Dept: BARIATRICS | Facility: CLINIC | Age: 30
End: 2020-04-06

## 2020-04-14 ENCOUNTER — TELEPHONE (OUTPATIENT)
Dept: BARIATRICS | Facility: CLINIC | Age: 30
End: 2020-04-14

## 2020-04-16 ENCOUNTER — OFFICE VISIT (OUTPATIENT)
Dept: BARIATRICS | Facility: CLINIC | Age: 30
End: 2020-04-16

## 2020-04-16 VITALS — WEIGHT: 263 LBS | BODY MASS INDEX: 43.77 KG/M2

## 2020-04-16 PROCEDURE — RECHECK: Performed by: DIETITIAN, REGISTERED

## 2020-06-23 ENCOUNTER — TELEPHONE (OUTPATIENT)
Dept: BARIATRICS | Facility: CLINIC | Age: 30
End: 2020-06-23

## 2021-04-12 ENCOUNTER — COSMETIC (OUTPATIENT)
Dept: PLASTIC SURGERY | Facility: CLINIC | Age: 31
End: 2021-04-12

## 2021-04-12 VITALS — BODY MASS INDEX: 44.65 KG/M2 | WEIGHT: 268 LBS | HEIGHT: 65 IN | TEMPERATURE: 97.7 F

## 2021-04-12 DIAGNOSIS — Z41.1 ENCOUNTER FOR COSMETIC PROCEDURE: Primary | ICD-10-CM

## 2021-04-12 PROCEDURE — RECHECK: Performed by: STUDENT IN AN ORGANIZED HEALTH CARE EDUCATION/TRAINING PROGRAM

## 2021-04-12 NOTE — PROGRESS NOTES
Botox Filler     First time: No, received filler 5 yrs ago  Allergies: none  Blood thinners: none  Pregnant: none     Patient has had  the lips 5 years ago, interested in filler for lips again  Risks and benefits discussed, patient agreed to proceed       0 5 cc to top lip  0 5 cc to bottom lip    Total 1 cc of restalyyina chene, 20% off     Patient tolerated well, f/u PRN        Tian Peña MD   [facility]   [address] [facility] [address], [address]   Office: [phone]

## 2024-02-21 PROBLEM — Z01.419 ENCOUNTER FOR ANNUAL ROUTINE GYNECOLOGICAL EXAMINATION: Status: RESOLVED | Noted: 2018-10-01 | Resolved: 2024-02-21

## 2024-03-04 NOTE — PROGRESS NOTES
A/P    1.  Annual exam    Last PAP - 10/1/2018- neg    Next due today with co testing    Scheduling of pap discussed in detail     2.  Birth control - tubal ligation        33 y.o.,Patient's last menstrual period was 02/29/2024 (exact date).  C/O no gyn concerns doing well       Past medical / social / surgical / family history reviewed and updated   Medication and allergies discussed in detail and updated     Review of Systems - History obtained from chart review and the patient  General ROS: negative  Psychological ROS: negative  Ophthalmic ROS: negative  ENT ROS: negative  Allergy and Immunology ROS: negative  Hematological and Lymphatic ROS: negative  Endocrine ROS: negative  Breast ROS: negative for breast lumps  Respiratory ROS: no cough, shortness of breath, or wheezing  Cardiovascular ROS: no chest pain or dyspnea on exertion  Gastrointestinal ROS: no abdominal pain, change in bowel habits, or black or bloody stools  Genito-Urinary ROS: no dysuria, trouble voiding, or hematuria  Musculoskeletal ROS: negative  Neurological ROS: no TIA or stroke symptoms  Dermatological ROS: negative        Physical Exam  Vitals reviewed. Exam conducted with a chaperone present.   Constitutional:       Appearance: She is well-developed.   Neck:      Thyroid: No thyromegaly.   Cardiovascular:      Rate and Rhythm: Normal rate.      Heart sounds: Normal heart sounds.   Pulmonary:      Effort: Pulmonary effort is normal. No accessory muscle usage or respiratory distress.      Breath sounds: Normal air entry.   Chest:      Chest wall: No tenderness.   Breasts:     Breasts are symmetrical.      Right: No inverted nipple, mass or tenderness.      Left: No inverted nipple, mass or tenderness.   Abdominal:      General: There is no distension.      Palpations: Abdomen is soft.      Tenderness: There is no abdominal tenderness. There is no right CVA tenderness, left CVA tenderness, guarding or rebound.   Genitourinary:     General:  Normal vulva.      Exam position: Lithotomy position.      Labia:         Right: No rash, tenderness, lesion or injury.         Left: No rash, tenderness, lesion or injury.       Vagina: Normal. No foreign body. No vaginal discharge or bleeding.      Cervix: Normal.      Uterus: Normal. Not enlarged and not fixed.       Adnexa: Right adnexa normal and left adnexa normal.        Right: No mass, tenderness or fullness.          Left: No mass, tenderness or fullness.        Rectum: No external hemorrhoid.   Musculoskeletal:      Cervical back: Normal range of motion.   Lymphadenopathy:      Cervical: No cervical adenopathy.      Upper Body:      Right upper body: No supraclavicular adenopathy.      Left upper body: No supraclavicular adenopathy.   Neurological:      Mental Status: She is alert and oriented to person, place, and time.   Psychiatric:         Speech: Speech normal.         Behavior: Behavior normal.         Thought Content: Thought content normal.         Judgment: Judgment normal.

## 2024-03-06 ENCOUNTER — OFFICE VISIT (OUTPATIENT)
Dept: OBGYN CLINIC | Facility: MEDICAL CENTER | Age: 34
End: 2024-03-06
Payer: COMMERCIAL

## 2024-03-06 VITALS — BODY MASS INDEX: 39.49 KG/M2 | HEIGHT: 65 IN | WEIGHT: 237 LBS

## 2024-03-06 DIAGNOSIS — Z12.4 ENCOUNTER FOR SCREENING FOR CERVICAL CANCER: ICD-10-CM

## 2024-03-06 DIAGNOSIS — Z01.419 WOMEN'S ANNUAL ROUTINE GYNECOLOGICAL EXAMINATION: Primary | ICD-10-CM

## 2024-03-06 PROCEDURE — 99395 PREV VISIT EST AGE 18-39: CPT | Performed by: OBSTETRICS & GYNECOLOGY

## 2024-03-06 PROCEDURE — G0476 HPV COMBO ASSAY CA SCREEN: HCPCS | Performed by: OBSTETRICS & GYNECOLOGY

## 2024-03-06 PROCEDURE — G0145 SCR C/V CYTO,THINLAYER,RESCR: HCPCS | Performed by: STUDENT IN AN ORGANIZED HEALTH CARE EDUCATION/TRAINING PROGRAM

## 2024-03-07 LAB
HPV HR 12 DNA CVX QL NAA+PROBE: POSITIVE
HPV16 DNA CVX QL NAA+PROBE: NEGATIVE
HPV18 DNA CVX QL NAA+PROBE: NEGATIVE

## 2024-03-14 ENCOUNTER — TELEPHONE (OUTPATIENT)
Age: 34
End: 2024-03-14

## 2024-03-14 LAB
LAB AP GYN PRIMARY INTERPRETATION: ABNORMAL
Lab: ABNORMAL
PATH INTERP SPEC-IMP: ABNORMAL

## 2024-03-14 NOTE — TELEPHONE ENCOUNTER
----- Message from Brock Caputo MD sent at 3/14/2024  3:50 PM EDT -----  Pl;ease let her know she has some mild abnormality on pap and needs colpo please.

## 2024-04-18 ENCOUNTER — OFFICE VISIT (OUTPATIENT)
Dept: BARIATRICS | Facility: CLINIC | Age: 34
End: 2024-04-18
Payer: COMMERCIAL

## 2024-04-18 VITALS
BODY MASS INDEX: 40.72 KG/M2 | WEIGHT: 244.4 LBS | TEMPERATURE: 97.2 F | HEIGHT: 65 IN | DIASTOLIC BLOOD PRESSURE: 70 MMHG | HEART RATE: 94 BPM | RESPIRATION RATE: 16 BRPM | SYSTOLIC BLOOD PRESSURE: 136 MMHG

## 2024-04-18 DIAGNOSIS — F98.8 ATTENTION DEFICIT DISORDER: ICD-10-CM

## 2024-04-18 DIAGNOSIS — E66.01 OBESITY, CLASS III, BMI 40-49.9 (MORBID OBESITY) (HCC): ICD-10-CM

## 2024-04-18 DIAGNOSIS — E11.65 TYPE 2 DIABETES MELLITUS WITH HYPERGLYCEMIA, WITH LONG-TERM CURRENT USE OF INSULIN (HCC): ICD-10-CM

## 2024-04-18 DIAGNOSIS — Z79.4 TYPE 2 DIABETES MELLITUS WITH HYPERGLYCEMIA, WITH LONG-TERM CURRENT USE OF INSULIN (HCC): ICD-10-CM

## 2024-04-18 DIAGNOSIS — G43.009 MIGRAINE WITHOUT AURA: Primary | ICD-10-CM

## 2024-04-18 PROCEDURE — 99204 OFFICE O/P NEW MOD 45 MIN: CPT | Performed by: PHYSICIAN ASSISTANT

## 2024-04-18 RX ORDER — FLUTICASONE PROPIONATE 50 MCG
2 SPRAY, SUSPENSION (ML) NASAL DAILY
COMMUNITY
Start: 2024-04-04

## 2024-04-18 RX ORDER — LANCING DEVICE
EACH MISCELLANEOUS
COMMUNITY
Start: 2024-03-22

## 2024-04-18 RX ORDER — INSULIN GLARGINE 100 [IU]/ML
INJECTION, SOLUTION SUBCUTANEOUS
COMMUNITY
Start: 2024-04-12

## 2024-04-18 RX ORDER — PEN NEEDLE, DIABETIC, SAFETY 30 GX3/16"
NEEDLE, DISPOSABLE MISCELLANEOUS
COMMUNITY
Start: 2024-03-06

## 2024-04-18 RX ORDER — ONDANSETRON 4 MG/1
4 TABLET, FILM COATED ORAL AS NEEDED
COMMUNITY
Start: 2024-02-17

## 2024-04-18 RX ORDER — LISINOPRIL 5 MG/1
5 TABLET ORAL DAILY
COMMUNITY
Start: 2024-04-15

## 2024-04-18 RX ORDER — METOPROLOL SUCCINATE 25 MG/1
TABLET, EXTENDED RELEASE ORAL
COMMUNITY

## 2024-04-18 RX ORDER — PHENTERMINE HYDROCHLORIDE 15 MG/1
15 CAPSULE ORAL EVERY MORNING
Qty: 30 CAPSULE | Refills: 1 | Status: SHIPPED | OUTPATIENT
Start: 2024-04-18

## 2024-04-18 RX ORDER — AZELASTINE 1 MG/ML
SPRAY, METERED NASAL
COMMUNITY
Start: 2024-04-11

## 2024-04-18 RX ORDER — NICOTINE POLACRILEX 2 MG
LOZENGE BUCCAL
COMMUNITY
Start: 2024-03-11

## 2024-04-18 NOTE — ASSESSMENT & PLAN NOTE
-Discussed options of HealthyCORE-Intensive Lifestyle Intervention Program, Very Low Calorie Diet-VLCD, Conservative Program, Nuno-En-Y Gastric Bypass, and Vertical Sleeve Gastrectomy and the role of weight loss medications.Explained the importance of making lifestyle changes if utilizing medication to aid in weight loss  -No longer interested in surgery  -Initial weight loss goal of 5-10% weight loss for improved health    -Patient is interested in pursuing Conservative Program  -Calorie goals(1500), sample menu (8535-3131), portion size guidelines, and food logging reviewed with the patient.     Goals:  -Food log (ie.) [email or web address]  - To drink at least 64oz of water daily.No sugary beverages.  -to continue to walk after meals and recommend trying to do strength training    Medication options discussed and will start on phentermine. To return in 1 month for recheck with nurse. Side effects discussed.    Limited other options- vomiting with low dose GLP-1 RA, kidney stone hx and side effects with wellbutrin    Initial Weight:  Goal Weight:

## 2024-04-18 NOTE — ASSESSMENT & PLAN NOTE
Lab Results   Component Value Date    HGBA1C 8.3 (H) 11/08/2022   Rx byetta, basaglar and metformin but taking inositol and berberine and uses insulin depenging on blood sugars.  She was put on ozempic for 6 weeks  and was sick.  Also was on trulicity with side effects.

## 2024-04-18 NOTE — PATIENT INSTRUCTIONS
1500 calories, recommend min. 60 gm protein  Try these alternative food options:  Protein shakes- Premier, Pure protein, Fairlife, Iconic  Lactose free protein shakes-  Fairlife, Ripple, Iconic, Uriostegui  Protein bars- Quest, Pure protein  Ice cream- Herberth's, Yasso, Enlightened, Halo Top   Chips- Quest protein chips, Cheese crisps   Bread- 647 wheat, Protein Keto bread, whole wheat connie bread, low carb/high protein wraps  Pasta- Chickpea pasta, Pasta zero or similar  Rice- Cauliflower rice, quinoa, wild rice, brown rice  Fruits- berries, cantaloupe, peaches, apples, oranges  Yogurt- Ratio (25g protein), Skyr, Two good, Chobani 60 jose a or 100 jose a, Oikos triple zero  Sugar alternatives- Stevia, Monk fruit, Swerve

## 2024-04-18 NOTE — PROGRESS NOTES
Assessment/Plan:    Migraine without aura  Was rx metoprolol but not taking it.      Type 2 diabetes mellitus (HCC)    Lab Results   Component Value Date    HGBA1C 8.3 (H) 11/08/2022   Rx byetta, basaglar and metformin but taking inositol and berberine and uses insulin depenging on blood sugars.  She was put on ozempic for 6 weeks  and was sick.  Also was on trulicity with side effects.     Attention deficit disorder  No longer on adderall and PDMP is consistent     Obesity, Class III, BMI 40-49.9 (morbid obesity) ([facility])  -Discussed options of HealthyCORE-Intensive Lifestyle Intervention Program, Very Low Calorie Diet-VLCD, Conservative Program, Nuno-En-Y Gastric Bypass, and Vertical Sleeve Gastrectomy and the role of weight loss medications.Explained the importance of making lifestyle changes if utilizing medication to aid in weight loss  -No longer interested in surgery  -Initial weight loss goal of 5-10% weight loss for improved health    -Patient is interested in pursuing Conservative Program  -Calorie goals(1500), sample menu (3273-8407), portion size guidelines, and food logging reviewed with the patient.     Goals:  -Food log (ie.) [email or web address]  - To drink at least 64oz of water daily.No sugary beverages.  -to continue to walk after meals and recommend trying to do strength training    Medication options discussed and will start on phentermine. To return in 1 month for recheck with nurse. Side effects discussed.    Limited other options- vomiting with low dose GLP-1 RA, kidney stone hx and side effects with wellbutrin    Initial Weight:  Goal Weight:        Follow up in approximately  4 weeks nurse visit and 4 months  with Non-Surgical Dietician.  I have spent a total time of 40 minutes on 04/18/24 in caring for this patient including Diagnostic results, Prognosis, Risks and benefits of tx options, Instructions for management, Patient and family education, Importance of tx compliance, Risk factor  reductions, Impressions, Counseling / Coordination of care, Documenting in the medical record, Reviewing / ordering tests, medicine, procedures  , and Obtaining or reviewing history  .      Diagnoses and all orders for this visit:    Migraine without aura    Type 2 diabetes mellitus with hyperglycemia, with long-term current use of insulin ([facility])    Obesity, Class III, BMI 40-49.9 (morbid obesity) ([facility])  -     phentermine 15 MG capsule; Take 1 capsule (15 mg total) by mouth every morning    Attention deficit disorder    Other orders  -     Benzocaine-Isopropyl Alcohol 6-70 % PADS; USE TO TEST BLOOD GLUCOSE TWICE DAILY E11.65  -     azelastine (ASTELIN) 0.1 % nasal spray; INSTILL 2 SPRAYS INTO EACH NOSTRIL 2 (TWO) TIMES A DAY. USE IN EACH NOSTRIL AS DIRECTED  -     fluticasone (FLONASE) 50 mcg/act nasal spray; 2 sprays daily  -     Eye Itch Relief 0.035 % ophthalmic solution; ADMINISTER 1 DROP TO BOTH EYES 2 (TWO) TIMES A DAY AS NEEDED (ALLERGY SYMPTOMS).  -     Pharmacist Choice Lancets MISC  -     lisinopril (ZESTRIL) 5 mg tablet; Take 5 mg by mouth daily (Patient not taking: Reported on 4/18/2024)  -     metoprolol succinate (TOPROL-XL) 25 mg 24 hr tablet; TAKE 1 TABLET (25 MG TOTAL) BY MOUTH DAILY. (Patient not taking: Reported on 4/18/2024)  -     ondansetron (ZOFRAN) 4 mg tablet; Take 4 mg by mouth as needed  -     Basaglar KwikPen 100 units/mL SOPN; INJECT 27 UNITS SUBCUTANEOUSLY TWICE A DAY E11.65  -     BD AutoShield Duo 30G X 5 MM MISC; USE 2X DAILY          Subjective:   Chief Complaint   Patient presents with    Consult     MWM- Consult, GW 200lb- Stop Bang 3/8       Patient ID: Nichole Mai  is a 33 y.o. female with excess weight/obesity here to pursue weight management.    Past Medical History:   Diagnosis Date    ADD (attention deficit disorder)     Anxiety     Blood type A+     pt unsure of blood type    Depression     Diabetes mellitus (HCC)     per Allscripts: Type 2    Dietary calcium  "deficiency     Last Assessed:8/18/15/pt denies    Dry eyes, bilateral     Dysmenorrhea     adequate response to IBuprofen; Last Assessed:11/23/16    GERD (gastroesophageal reflux disease)     occas    Hematuria     Last Assessed:12/20/16/not currently pt reports    History of kidney stones     Hypercholesterolemia     not currently pt reports    IBS (irritable bowel syndrome)     resolved with stopping metforming    Irregular heart beat     pt reports one time had irregular heart beat noted on EKG    Menorrhagia     Migraine without aura 4/18/2024    Morbid obesity due to excess calories (HCC)     Last Assessed:1/21/16    Motion sickness     PCOS (polycystic ovarian syndrome)     Shortness of breath     exertional    Tooth missing     \"gums are receding to have surgery in the future\"       HPI: Here for MWM consult    She had been seen prior and was considering surgery but feels she is able to lose on her own. She has lost weight and was down to 219. She then has started to gain more weight.      She does feel more inflammed recntly.  With diet changes and probiotic w/cinnamon it was was helping  But now is now.  Taking vitamins and walking after meals has helped .Tries to get 60 gm protein in daily    She has been on metformin since she was 15.  She was on ozempic and trulicity and had vomiting and could not tolerate low doses    Dairy cuases GI upset  Obesity/Excess Weight:  Severity:  class III w/dm, PCOS  Onset:  hole life    Modifiers: Diet and Exercise, Physician Supervised Weight Loss Program, and Commercial Weight Loss Programs-ie. Weight Watchers, Year Upig, Nutrisystem, etc.  Contributing factors: Insufficient Physical Activity and Medications    Hydration:water more than 64 oz, green tea or mint tea in the am   Alcohol:   Exercise:15 min. Walking after meal daily  Occupation:nail   Sleep:  Dining out/takeout:    Diet Recall:  Water, vitamins. Protein oatmeal or banana or duck egg  Sometimes more " snacking if busy at work-cheese, homemde beef jerky  Fish, sweet protein, vegetable      The following portions of the patient's history were reviewed and updated as appropriate: She  has a past medical history of ADD (attention deficit disorder), Anxiety, Blood type A+, Depression, Diabetes mellitus (HCC), Dietary calcium deficiency, Dry eyes, bilateral, Dysmenorrhea, GERD (gastroesophageal reflux disease), Hematuria, History of kidney stones, Hypercholesterolemia, IBS (irritable bowel syndrome), Irregular heart beat, Menorrhagia, Migraine without aura (4/18/2024), Morbid obesity due to excess calories ([facility]), Motion sickness, PCOS (polycystic ovarian syndrome), Shortness of breath, and Tooth missing.  She   Patient Active Problem List    Diagnosis Date Noted    Migraine without aura 04/18/2024    Status post bilateral salpingectomy 08/14/2019    Encounter for sterilization 05/21/2019    Vitamin D deficiency 05/14/2019    Hyperlipidemia 05/13/2019    Pap smear for cervical cancer screening 10/01/2018    Contraceptive education 10/01/2018    Dietary calcium deficiency 10/01/2018    Inadequate exercise 10/01/2018    Type 2 diabetes mellitus (HCC) 07/13/2015    PCOS (polycystic ovarian syndrome) 04/06/2015    Attention deficit disorder 05/15/2014    Obesity, Class III, BMI 40-49.9 (morbid obesity) (HCC) 04/14/2014    Allergic rhinitis 01/14/2014     She  has a past surgical history that includes Tonsillectomy; [place] tooth extraction; Dilation and evacuation (12/15/2017); INSERTION OF INTRAUTERINE DEVICE (IUD) (10/18/2018); REMOVAL OF INTRAUTERINE DEVICE (IUD); and pr laparoscopy w/rmvl adnexal structures (Bilateral, 8/14/2019).  Her family history includes Alcohol abuse in her father; Bipolar disorder in her mother; Diabetes in her father; Diabetes type II in her father and paternal grandmother; Heart disease in her father and maternal grandfather; Lymphoma in her maternal grandmother; Migraines in her mother; Obesity  in her father, mother, and sister; Osteoporosis in her maternal grandmother; Prostate cancer in her maternal grandfather; Stroke in her father.  She  reports that she has never smoked. She has never used smokeless tobacco. She reports current alcohol use of about 2.0 standard drinks of alcohol per week. She reports that she does not currently use drugs after having used the following drugs: Marijuana.  Current Outpatient Medications   Medication Sig Dispense Refill    ACCU-CHEK SMARTVIEW test strip       amphetamine-dextroamphetamine (ADDERALL) 15 MG tablet as needed  0    APPLE CIDER VINEGAR PO Take by mouth      azelastine (ASTELIN) 0.1 % nasal spray INSTILL 2 SPRAYS INTO EACH NOSTRIL 2 (TWO) TIMES A DAY. USE IN EACH NOSTRIL AS DIRECTED      Basaglar KwikPen 100 units/mL SOPN INJECT 27 UNITS SUBCUTANEOUSLY TWICE A DAY E11.65      BD AutoShield Duo 30G X 5 MM MISC USE 2X DAILY      Benzocaine-Isopropyl Alcohol 6-70 % PADS USE TO TEST BLOOD GLUCOSE TWICE DAILY E11.65      CAREONE UNIFINE PENTIPS PLUS 32G X 4 MM MISC INJECT TWO TIMES A DAY WITH BYETTA  3    Cholecalciferol (VITAMIN D3 PO) Take by mouth       Cyanocobalamin (B-12 PO) Take by mouth      Eye Itch Relief 0.035 % ophthalmic solution ADMINISTER 1 DROP TO BOTH EYES 2 (TWO) TIMES A DAY AS NEEDED (ALLERGY SYMPTOMS).      fluticasone (FLONASE) 50 mcg/act nasal spray 2 sprays daily      insulin glargine (LANTUS) 100 units/mL subcutaneous injection Inject 23 Units under the skin daily at bedtime  0    Omega-3 Fatty Acids (OMEGA 3 PO) Take by mouth      ondansetron (ZOFRAN) 4 mg tablet Take 4 mg by mouth as needed      Pharmacist Choice Lancets MISC       phentermine 15 MG capsule Take 1 capsule (15 mg total) by mouth every morning 30 capsule 1    BYETTA 10 MCG PEN 10 MCG/0.04ML SOPN INJECT 10 MCG UNDER THE SKIN TWO TIMES A DAY BEFORE MEALS (Patient not taking: Reported on 4/18/2024)  1    lisinopril (ZESTRIL) 5 mg tablet Take 5 mg by mouth daily (Patient not  taking: Reported on 4/18/2024)      metFORMIN (GLUCOPHAGE-XR) 500 mg 24 hr tablet Take 1 tablet (500 mg total) by mouth 2 (two) times a day (Patient not taking: Reported on 4/18/2024)  0    metoprolol succinate (TOPROL-XL) 25 mg 24 hr tablet TAKE 1 TABLET (25 MG TOTAL) BY MOUTH DAILY. (Patient not taking: Reported on 4/18/2024)       No current facility-administered medications for this visit.     Current Outpatient Medications on File Prior to Visit   Medication Sig    ACCU-CHEK SMARTVIEW test strip     amphetamine-dextroamphetamine (ADDERALL) 15 MG tablet as needed    APPLE CIDER VINEGAR PO Take by mouth    azelastine (ASTELIN) 0.1 % nasal spray INSTILL 2 SPRAYS INTO EACH NOSTRIL 2 (TWO) TIMES A DAY. USE IN EACH NOSTRIL AS DIRECTED    Basaglar KwikPen 100 units/mL SOPN INJECT 27 UNITS SUBCUTANEOUSLY TWICE A DAY E11.65    BD AutoShield Duo 30G X 5 MM MISC USE 2X DAILY    Benzocaine-Isopropyl Alcohol 6-70 % PADS USE TO TEST BLOOD GLUCOSE TWICE DAILY E11.65    CAREONE UNIFINE PENTIPS PLUS 32G X 4 MM MISC INJECT TWO TIMES A DAY WITH BYETTA    Cholecalciferol (VITAMIN D3 PO) Take by mouth     Cyanocobalamin (B-12 PO) Take by mouth    Eye Itch Relief 0.035 % ophthalmic solution ADMINISTER 1 DROP TO BOTH EYES 2 (TWO) TIMES A DAY AS NEEDED (ALLERGY SYMPTOMS).    fluticasone (FLONASE) 50 mcg/act nasal spray 2 sprays daily    insulin glargine (LANTUS) 100 units/mL subcutaneous injection Inject 23 Units under the skin daily at bedtime    Omega-3 Fatty Acids (OMEGA 3 PO) Take by mouth    ondansetron (ZOFRAN) 4 mg tablet Take 4 mg by mouth as needed    Pharmacist Choice Lancets MISC     BYETTA 10 MCG PEN 10 MCG/0.04ML SOPN INJECT 10 MCG UNDER THE SKIN TWO TIMES A DAY BEFORE MEALS (Patient not taking: Reported on 4/18/2024)    lisinopril (ZESTRIL) 5 mg tablet Take 5 mg by mouth daily (Patient not taking: Reported on 4/18/2024)    metFORMIN (GLUCOPHAGE-XR) 500 mg 24 hr tablet Take 1 tablet (500 mg total) by mouth 2 (two) times a  "day (Patient not taking: Reported on 4/18/2024)    metoprolol succinate (TOPROL-XL) 25 mg 24 hr tablet TAKE 1 TABLET (25 MG TOTAL) BY MOUTH DAILY. (Patient not taking: Reported on 4/18/2024)     No current facility-administered medications on file prior to visit.     She is allergic to amoxicillin..    Review of Systems   Constitutional:  Negative for fever.   Respiratory:  Negative for shortness of breath.    Cardiovascular:  Negative for chest pain and palpitations.   Gastrointestinal:  Negative for abdominal pain, constipation, diarrhea and vomiting.   Genitourinary:  Negative for difficulty urinating.   Skin:  Negative for rash.   Neurological:  Negative for headaches.   Psychiatric/Behavioral:  Negative for dysphoric mood. The patient is not nervous/anxious.        Objective:    /70   Pulse 94   Temp (!) 97.2 °F (36.2 °C)   Resp 16   Ht 5' 5.25\" (1.657 m)   Wt 111 kg (244 lb 6.4 oz)   BMI 40.36 kg/m²     Physical Exam  Vitals and nursing note reviewed.   Constitutional:       General: She is not in acute distress.     Appearance: She is well-developed. She is obese.   HENT:      Head: Normocephalic and atraumatic.   Eyes:      Conjunctiva/sclera: Conjunctivae normal.   Neck:      Thyroid: No thyromegaly.   Pulmonary:      Effort: Pulmonary effort is normal. No respiratory distress.   Skin:     Findings: No rash (visible).   Neurological:      Mental Status: She is alert and oriented to person, place, and time.   Psychiatric:         Mood and Affect: Mood normal.         Behavior: Behavior normal.        "

## 2024-04-19 ENCOUNTER — TELEPHONE (OUTPATIENT)
Dept: BARIATRICS | Facility: CLINIC | Age: 34
End: 2024-04-19

## 2024-04-22 NOTE — TELEPHONE ENCOUNTER
PA for Phentermine 15mg     Submitted via    [x]CMM-KEY [id number]  [][employer]-Case ID #   []Faxed to plan   []Other website   []Phone call Case ID #     Office notes sent, clinical questions answered. Awaiting determination    Turnaround time for your insurance to make a decision on your Prior Authorization can take 7-21 business days.

## 2024-04-25 NOTE — TELEPHONE ENCOUNTER
PA for phentermine all doses Approved   Date(s) approved 7/22/24  Case #     Patient advised by [] MyChart Message                      [x] Phone call (left message)      Pharmacy advised by [x]Fax                                     []Phone call    Approval letter scanned into Media Yes

## 2024-05-13 ENCOUNTER — CLINICAL SUPPORT (OUTPATIENT)
Dept: BARIATRICS | Facility: CLINIC | Age: 34
End: 2024-05-13

## 2024-05-13 VITALS
DIASTOLIC BLOOD PRESSURE: 86 MMHG | HEIGHT: 65 IN | TEMPERATURE: 98.7 F | BODY MASS INDEX: 39.42 KG/M2 | WEIGHT: 236.6 LBS | SYSTOLIC BLOOD PRESSURE: 130 MMHG | HEART RATE: 80 BPM

## 2024-05-13 DIAGNOSIS — R63.5 ABNORMAL WEIGHT GAIN: Primary | ICD-10-CM

## 2024-05-13 PROCEDURE — RECHECK

## 2024-05-13 NOTE — PROGRESS NOTES
Patient last visit weight:244.4  Patient current visit weight:236.6    If you are taking phentermine or other oral weight loss medications, are you experiencing any of the following symptoms:  Headache: NO  Blurred Vision: NO  Chest Pain: NO  Palpitations:NO  Insomnia: NO  SPECIFY ORAL MEDICATION AND DOSAGE: PHENTERMINE NEED REFILL     If you are taking an injectable medication,  are you experiencing any of the following symptoms:  Bloating: NO  Nausea:NO  Vomiting: NO  Constipation: NO  Diarrhea:NO  SPECIFY INJECTABLE MEDICATION AND CURRENT DOSAGE:      Vitals:    Is BP less than 100/60?NO  Is BP greater than 140/90?NO  Is HR greater than 100?NO  **If yes to any of the above, have patient relax and repeat in 5-10 minutes**    Repeat values:    Is BP less than 100/60?  Is BP greater than 140/90?  Is HR greater than 100?  **If values remain outside of ranges above, please consult provider for next steps**

## 2024-05-14 DIAGNOSIS — E66.01 OBESITY, CLASS III, BMI 40-49.9 (MORBID OBESITY) (HCC): ICD-10-CM

## 2024-05-14 RX ORDER — PHENTERMINE HYDROCHLORIDE 15 MG/1
15 CAPSULE ORAL EVERY MORNING
Qty: 30 CAPSULE | Refills: 2 | Status: SHIPPED | OUTPATIENT
Start: 2024-05-14

## 2024-05-30 ENCOUNTER — PROCEDURE VISIT (OUTPATIENT)
Dept: OBGYN CLINIC | Facility: MEDICAL CENTER | Age: 34
End: 2024-05-30
Payer: COMMERCIAL

## 2024-05-30 VITALS
BODY MASS INDEX: 38.28 KG/M2 | WEIGHT: 238.2 LBS | DIASTOLIC BLOOD PRESSURE: 80 MMHG | SYSTOLIC BLOOD PRESSURE: 116 MMHG | HEIGHT: 66 IN

## 2024-05-30 DIAGNOSIS — R87.810 CERVICAL HIGH RISK HPV (HUMAN PAPILLOMAVIRUS) TEST POSITIVE: Primary | ICD-10-CM

## 2024-05-30 PROCEDURE — 88305 TISSUE EXAM BY PATHOLOGIST: CPT | Performed by: PATHOLOGY

## 2024-05-30 PROCEDURE — 57454 BX/CURETT OF CERVIX W/SCOPE: CPT | Performed by: OBSTETRICS & GYNECOLOGY

## 2024-06-03 PROCEDURE — 88305 TISSUE EXAM BY PATHOLOGIST: CPT | Performed by: PATHOLOGY

## 2024-06-04 NOTE — PROGRESS NOTES
"   Colposcopy     Date/Time  5/30/2024 2:15 PM     Universal Protocol   Consent: The procedure was performed in an emergent situation. Verbal consent obtained.  Risks and benefits: risks, benefits and alternatives were discussed  Consent given by: patient  Time out: Immediately prior to procedure a \"time out\" was called to verify the correct patient, procedure, equipment, support staff and site/side marked as required.  Patient understanding: patient states understanding of the procedure being performed  Patient consent: the patient's understanding of the procedure matches consent given  Procedure consent: procedure consent matches procedure scheduled  Relevant documents: relevant documents present and verified  Test results: test results available and properly labeled  Site marked: the operative site was marked  Radiology Images displayed and confirmed. If images not available, report reviewed: imaging studies available  Required items: required blood products, implants, devices, and special equipment available  Patient identity confirmed: verbally with patient     Performed by  Brock Caputo MD   Authorized by  Brock Caputo MD     Pre-procedure details      Pre-procedure timeout performed: yes      Prepped with: acetic acid     Indication    LSIL   Procedure Details   Procedure: Colposcopy w/ cervical biopsy and ECC      Under satisfactory analgesia the patient was prepped and draped in the dorsal lithotomy position: yes      Tornillo speculum was placed in the vagina: yes      Under colposcopic examination the transition zone was seen in entirety: yes      Intracervical block was performed: no      Endocervix was curetted using a Kevorkian curette: yes      Cervical biopsy performed with a cervical biopsy punch: yes      Tampon inserted: no      Monsel's solution was applied: no      Biopsy(s): yes      Location:  8    Specimen to pathology: yes     Post-procedure      Patient tolerance of procedure:  Tolerated " well, no immediate complications

## 2024-10-02 ENCOUNTER — COSMETIC (OUTPATIENT)
Dept: PLASTIC SURGERY | Facility: CLINIC | Age: 34
End: 2024-10-02

## 2024-10-02 DIAGNOSIS — Z41.1 ENCOUNTER FOR COSMETIC SURGERY: Primary | ICD-10-CM

## 2024-10-02 PROCEDURE — BOTOX1U PR BOTOX BY THE UNIT: Performed by: STUDENT IN AN ORGANIZED HEALTH CARE EDUCATION/TRAINING PROGRAM

## 2024-10-02 PROCEDURE — RECHECK: Performed by: STUDENT IN AN ORGANIZED HEALTH CARE EDUCATION/TRAINING PROGRAM

## 2024-10-02 PROCEDURE — BOTOX1 ONE AREA OR 25 UNITS: Performed by: STUDENT IN AN ORGANIZED HEALTH CARE EDUCATION/TRAINING PROGRAM

## 2024-10-02 NOTE — PROGRESS NOTES
Botox Consult     First time?: yes  Allergies: NKDA  Blood thinners: no   Pregnant: no  Neuromuscular conditions: no    Patient has never had botox, interested in lip flip.     Discussed superficial injection in the superior orbicularis oris muscle, will start conservatively. Discussed may require more for needed effect.     Will proceed with 2 units of botox     Risks and benefits discussed, patient agreed to proceed.     Total 2 units to superior, superficial orbicularis oris muscle, 30% off     Patient tolerated well, f/u PRN       Deon Gillespie MD   [facility]   [address] [place] [address], [address]   [place] [address]   Office: [phone]

## 2024-11-17 ENCOUNTER — OFFICE VISIT (OUTPATIENT)
Dept: URGENT CARE | Facility: MEDICAL CENTER | Age: 34
End: 2024-11-17
Payer: COMMERCIAL

## 2024-11-17 VITALS
TEMPERATURE: 97.9 F | WEIGHT: 250 LBS | OXYGEN SATURATION: 96 % | DIASTOLIC BLOOD PRESSURE: 96 MMHG | HEART RATE: 95 BPM | RESPIRATION RATE: 22 BRPM | BODY MASS INDEX: 40.35 KG/M2 | SYSTOLIC BLOOD PRESSURE: 132 MMHG

## 2024-11-17 DIAGNOSIS — L03.90 CELLULITIS, UNSPECIFIED CELLULITIS SITE: Primary | ICD-10-CM

## 2024-11-17 PROCEDURE — 99213 OFFICE O/P EST LOW 20 MIN: CPT | Performed by: PHYSICIAN ASSISTANT

## 2024-11-17 RX ORDER — MUPIROCIN 20 MG/G
OINTMENT TOPICAL 2 TIMES DAILY
Qty: 30 G | Refills: 0 | Status: SHIPPED | OUTPATIENT
Start: 2024-11-17 | End: 2024-11-24

## 2024-11-17 RX ORDER — CEPHALEXIN 500 MG/1
500 CAPSULE ORAL EVERY 8 HOURS SCHEDULED
Qty: 21 CAPSULE | Refills: 0 | Status: SHIPPED | OUTPATIENT
Start: 2024-11-17 | End: 2024-11-24

## 2024-11-17 NOTE — PROGRESS NOTES
[facility]        NAME: Nichole Mai is a 34 y.o. female  : 1990    MRN: [id number]  DATE: 2024  TIME: 4:51 PM    Assessment and Plan   Cellulitis, unspecified cellulitis site [L03.90]  1. Cellulitis, unspecified cellulitis site  mupirocin (BACTROBAN) 2 % ointment    cephalexin (KEFLEX) 500 mg capsule        Patient Instructions     Take medicine as prescribed  C/w dial soap to cleanse wound  C/w aquaphor applications  Use mupirocin after course of keflex if problem persists  Follow up with PCP in 3-5 days.  Proceed to  ER if symptoms worsen.    If tests have been performed at [facility], our office will contact you with results if changes need to be made to the care plan discussed with you at the visit.  You can review your full results on [facility] MyChart.    Chief Complaint     Chief Complaint   Patient presents with    Tattoo Issue     Got a tattoo on  and feel like it is not healing. Tattoo on right shin area. Area is painful, sore and tight. Has been applying aquaphor and Scented tattoo butter. No fevers or chills. Type 2 DM as is concerned about infections         History of Present Illness       33yo F presents c/o possible infection to tattoo to right lower leg.  Patient had tattoo 11 days ago.  Reports it is slow to heal and she has open skin overlying the wound.  She has type 2 diabetes.  She denies fever, chills, fatigue, malaise.        Review of Systems   Review of Systems   Constitutional:  Negative for chills, diaphoresis, fatigue and fever.   Respiratory:  Negative for shortness of breath.    Cardiovascular:  Positive for leg swelling (surrounding tattoo).   Skin:  Positive for wound. Negative for rash.         Current Medications       Current Outpatient Medications:     ACCU-CHEK SMARTVIEW test strip, , Disp: , Rfl:     APPLE CIDER VINEGAR PO, Take by mouth, Disp: , Rfl:     azelastine (ASTELIN) 0.1 % nasal spray, INSTILL 2 SPRAYS INTO EACH NOSTRIL 2  (TWO) TIMES A DAY. USE IN EACH NOSTRIL AS DIRECTED, Disp: , Rfl:     Basaglar KwikPen 100 units/mL SOPN, INJECT 27 UNITS SUBCUTANEOUSLY TWICE A DAY E11.65, Disp: , Rfl:     BD AutoShield Duo 30G X 5 MM MISC, USE 2X DAILY, Disp: , Rfl:     Benzocaine-Isopropyl Alcohol 6-70 % PADS, USE TO TEST BLOOD GLUCOSE TWICE DAILY E11.65, Disp: , Rfl:     BYETTA 10 MCG PEN 10 MCG/0.04ML SOPN, , Disp: , Rfl: 1    CAREONE UNIFINE PENTIPS PLUS 32G X 4 MM MISC, INJECT TWO TIMES A DAY WITH BYETTA, Disp: , Rfl: 3    cephalexin (KEFLEX) 500 mg capsule, Take 1 capsule (500 mg total) by mouth every 8 (eight) hours for 7 days, Disp: 21 capsule, Rfl: 0    Cholecalciferol (VITAMIN D3 PO), Take by mouth , Disp: , Rfl:     Cyanocobalamin (B-12 PO), Take by mouth, Disp: , Rfl:     Eye Itch Relief 0.035 % ophthalmic solution, ADMINISTER 1 DROP TO BOTH EYES 2 (TWO) TIMES A DAY AS NEEDED (ALLERGY SYMPTOMS)., Disp: , Rfl:     fluticasone (FLONASE) 50 mcg/act nasal spray, 2 sprays daily, Disp: , Rfl:     insulin glargine (LANTUS) 100 units/mL subcutaneous injection, Inject 23 Units under the skin daily at bedtime, Disp: , Rfl: 0    metFORMIN (GLUCOPHAGE-XR) 500 mg 24 hr tablet, Take 1 tablet (500 mg total) by mouth 2 (two) times a day, Disp: , Rfl: 0    mupirocin (BACTROBAN) 2 % ointment, Apply topically 2 (two) times a day for 7 days, Disp: 30 g, Rfl: 0    Omega-3 Fatty Acids (OMEGA 3 PO), Take by mouth, Disp: , Rfl:     ondansetron (ZOFRAN) 4 mg tablet, Take 4 mg by mouth as needed, Disp: , Rfl:     Pharmacist Choice Lancets MISC, , Disp: , Rfl:     phentermine 15 MG capsule, Take 1 capsule (15 mg total) by mouth every morning, Disp: 30 capsule, Rfl: 2    Current Allergies     Allergies as of 11/17/2024 - Reviewed 11/17/2024   Allergen Reaction Noted    Amoxicillin Other (See Comments) 04/06/2015    Semaglutide GI Intolerance 01/06/2023            The following portions of the patient's history were reviewed and updated as appropriate: allergies,  "current medications, past family history, past medical history, past social history, past surgical history and problem list.     Past Medical History:   Diagnosis Date    ADD (attention deficit disorder)     Anxiety     Blood type A+     pt unsure of blood type    Depression     Diabetes mellitus (HCC)     per [employer]: Type 2    Dietary calcium deficiency     Last Assessed:8/18/15/pt denies    Dry eyes, bilateral     Dysmenorrhea     adequate response to IBuprofen; Last Assessed:11/23/16    GERD (gastroesophageal reflux disease)     occas    Hematuria     Last Assessed:12/20/16/not currently pt reports    History of kidney stones     Hypercholesterolemia     not currently pt reports    IBS (irritable bowel syndrome)     resolved with stopping metforming    Irregular heart beat     pt reports one time had irregular heart beat noted on EKG    Menorrhagia     Migraine without aura 4/18/2024    Morbid obesity due to excess calories (HCC)     Last Assessed:1/21/16    Motion sickness     PCOS (polycystic ovarian syndrome)     Shortness of breath     exertional    Tooth missing     \"gums are receding to have surgery in the future\"       Past Surgical History:   Procedure Laterality Date    DILATION AND EVACUATION  12/15/2017    ETOP    INSERTION OF INTRAUTERINE DEVICE (IUD)  10/18/2018    RADHA MALDONADO LAPAROSCOPY W/RMVL ADNEXAL STRUCTURES Bilateral 8/14/2019    Procedure: SALPINGECTOMY, LAPAROSCOPIC;  Surgeon: Brock Caputo MD;  Location: [facility];  Service: Gynecology    REMOVAL OF INTRAUTERINE DEVICE (IUD)      TONSILLECTOMY      WISDOM TOOTH EXTRACTION         Family History   Problem Relation Age of Onset    Bipolar disorder Mother     Migraines Mother     Obesity Mother     Stroke Father     Diabetes type II Father     Alcohol abuse Father     Heart disease Father     Diabetes Father     Obesity Father     Osteoporosis Maternal Grandmother     Lymphoma Maternal Grandmother     Heart disease Maternal Grandfather "         Ischemic    Prostate cancer Maternal Grandfather     Diabetes type II Paternal Grandmother     Obesity Sister     Breast cancer Neg Hx     Colon cancer Neg Hx     Ovarian cancer Neg Hx          Medications have been verified.        Objective   /96   Pulse 95   Temp 97.9 °F (36.6 °C)   Resp 22   Wt 113 kg (250 lb)   SpO2 96%   BMI 40.35 kg/m²   No LMP recorded.       Physical Exam     Physical Exam  Constitutional:       Appearance: Normal appearance.   Cardiovascular:      Rate and Rhythm: Normal rate and regular rhythm.   Pulmonary:      Effort: Pulmonary effort is normal.      Breath sounds: Normal breath sounds.   Abdominal:      General: Abdomen is flat.      Palpations: Abdomen is soft.   Skin:     Comments: Approx 8cm round tattoo to distal aspect of right anteriolateral leg, scattered areas of open skin with yellow crusting overlying; slight edema surrounding the tattoo but no erythema; NV intact distally   Neurological:      Mental Status: She is alert.

## (undated) DEVICE — CATH FOLEY 24FR 30ML 2 WAY UNCOATED SILICONE

## (undated) DEVICE — BETHLEHEM UNIVERSAL GYN LAP PK: Brand: CARDINAL HEALTH

## (undated) DEVICE — GLOVE INDICATOR PI UNDERGLOVE SZ 7 BLUE

## (undated) DEVICE — INTENDED FOR TISSUE SEPARATION, AND OTHER PROCEDURES THAT REQUIRE A SHARP SURGICAL BLADE TO PUNCTURE OR CUT.: Brand: BARD-PARKER SAFETY BLADES SIZE 11, STERILE

## (undated) DEVICE — GLOVE PI ULTRA TOUCH SZ.7.5

## (undated) DEVICE — IRRIG ENDO FLO TUBING

## (undated) DEVICE — [HIGH FLOW INSUFFLATOR,  DO NOT USE IF PACKAGE IS DAMAGED,  KEEP DRY,  KEEP AWAY FROM SUNLIGHT,  PROTECT FROM HEAT AND RADIOACTIVE SOURCES.]: Brand: PNEUMOSURE

## (undated) DEVICE — ADHESIVE SKN CLSR HISTOACRYL FLEX 0.5ML LF

## (undated) DEVICE — BLUNT TIP LAPAROSCOPIC SEALER/DIVIDER: Brand: LIGASURE

## (undated) DEVICE — PREMIUM DRY TRAY LF: Brand: MEDLINE INDUSTRIES, INC.

## (undated) DEVICE — CHLORAPREP HI-LITE 26ML ORANGE

## (undated) DEVICE — GLOVE INDICATOR PI UNDERGLOVE SZ 6.5 BLUE

## (undated) DEVICE — BLUE HEAT SCOPE WARMER

## (undated) DEVICE — PVC URETHRAL CATHETER: Brand: DOVER

## (undated) DEVICE — DRAPE EQUIPMENT RF WAND

## (undated) DEVICE — GLOVE PI ULTRA TOUCH SZ.6.5

## (undated) DEVICE — ASTOUND STANDARD SURGICAL GOWN, XXL: Brand: CONVERTORS

## (undated) DEVICE — SCD SEQUENTIAL COMPRESSION COMFORT SLEEVE MEDIUM KNEE LENGTH: Brand: KENDALL SCD

## (undated) DEVICE — STANDARD SURGICAL GOWN, L: Brand: CONVERTORS

## (undated) DEVICE — TROCAR: Brand: KII® SLEEVE

## (undated) DEVICE — SUT MONOCRYL 4-0 PS-2 27 IN Y426H

## (undated) DEVICE — GLOVE INDICATOR PI UNDERGLOVE SZ 7.5 BLUE

## (undated) DEVICE — GLOVE PI ULTRA TOUCH SZ.7.0

## (undated) DEVICE — TROCAR: Brand: KII FIOS FIRST ENTRY

## (undated) DEVICE — SUT VICRYL 4-0 PS-2 27 IN J426H